# Patient Record
Sex: MALE | Race: WHITE | NOT HISPANIC OR LATINO | Employment: OTHER | URBAN - NONMETROPOLITAN AREA
[De-identification: names, ages, dates, MRNs, and addresses within clinical notes are randomized per-mention and may not be internally consistent; named-entity substitution may affect disease eponyms.]

---

## 2022-11-11 ENCOUNTER — APPOINTMENT (EMERGENCY)
Dept: RADIOLOGY | Facility: HOSPITAL | Age: 82
End: 2022-11-11

## 2022-11-11 ENCOUNTER — HOSPITAL ENCOUNTER (INPATIENT)
Facility: HOSPITAL | Age: 82
LOS: 2 days | Discharge: HOME HEALTH | End: 2022-11-13
Attending: EMERGENCY MEDICINE | Admitting: STUDENT IN AN ORGANIZED HEALTH CARE EDUCATION/TRAINING PROGRAM

## 2022-11-11 ENCOUNTER — APPOINTMENT (EMERGENCY)
Dept: CT IMAGING | Facility: HOSPITAL | Age: 82
End: 2022-11-11

## 2022-11-11 DIAGNOSIS — Z90.02 HISTORY OF LARYNGECTOMY: ICD-10-CM

## 2022-11-11 DIAGNOSIS — E11.65 TYPE 2 DIABETES MELLITUS WITH HYPERGLYCEMIA, WITHOUT LONG-TERM CURRENT USE OF INSULIN (HCC): ICD-10-CM

## 2022-11-11 DIAGNOSIS — R41.82 ALTERED MENTAL STATUS: Primary | ICD-10-CM

## 2022-11-11 DIAGNOSIS — G30.9 ALZHEIMER'S DEMENTIA (HCC): ICD-10-CM

## 2022-11-11 DIAGNOSIS — N30.90 CYSTITIS: ICD-10-CM

## 2022-11-11 DIAGNOSIS — F02.80 ALZHEIMER'S DEMENTIA (HCC): ICD-10-CM

## 2022-11-11 PROBLEM — R77.8 ELEVATED TROPONIN: Status: ACTIVE | Noted: 2022-11-11

## 2022-11-11 PROBLEM — A41.9 SEVERE SEPSIS (HCC): Status: ACTIVE | Noted: 2022-11-11

## 2022-11-11 PROBLEM — R65.20 SEVERE SEPSIS (HCC): Status: ACTIVE | Noted: 2022-11-11

## 2022-11-11 PROBLEM — G93.41 ACUTE METABOLIC ENCEPHALOPATHY: Status: ACTIVE | Noted: 2022-11-11

## 2022-11-11 PROBLEM — E44.0 MODERATE PROTEIN-CALORIE MALNUTRITION (HCC): Status: ACTIVE | Noted: 2022-11-11

## 2022-11-11 PROBLEM — N30.00 ACUTE CYSTITIS: Status: ACTIVE | Noted: 2022-11-11

## 2022-11-11 LAB
2HR DELTA HS TROPONIN: 3 NG/L
4HR DELTA HS TROPONIN: 13 NG/L
ALBUMIN SERPL BCP-MCNC: 3.9 G/DL (ref 3.5–5)
ALP SERPL-CCNC: 139 U/L (ref 46–116)
ALT SERPL W P-5'-P-CCNC: 35 U/L (ref 12–78)
ANION GAP SERPL CALCULATED.3IONS-SCNC: 11 MMOL/L (ref 4–13)
AST SERPL W P-5'-P-CCNC: 28 U/L (ref 5–45)
BACTERIA UR QL AUTO: ABNORMAL /HPF
BASE EX.OXY STD BLDV CALC-SCNC: 91.9 % (ref 60–80)
BASE EXCESS BLDV CALC-SCNC: 1.9 MMOL/L
BASOPHILS # BLD AUTO: 0.03 THOUSANDS/ÂΜL (ref 0–0.1)
BASOPHILS NFR BLD AUTO: 0 % (ref 0–1)
BETA-HYDROXYBUTYRATE: 0.2 MMOL/L
BILIRUB SERPL-MCNC: 1.48 MG/DL (ref 0.2–1)
BILIRUB UR QL STRIP: NEGATIVE
BUN SERPL-MCNC: 19 MG/DL (ref 5–25)
CALCIUM SERPL-MCNC: 9.6 MG/DL (ref 8.3–10.1)
CARDIAC TROPONIN I PNL SERPL HS: 72 NG/L
CARDIAC TROPONIN I PNL SERPL HS: 75 NG/L
CARDIAC TROPONIN I PNL SERPL HS: 85 NG/L
CHLORIDE SERPL-SCNC: 95 MMOL/L (ref 96–108)
CK SERPL-CCNC: 109 U/L (ref 39–308)
CLARITY UR: ABNORMAL
CO2 SERPL-SCNC: 28 MMOL/L (ref 21–32)
COLOR UR: YELLOW
CREAT SERPL-MCNC: 1.27 MG/DL (ref 0.6–1.3)
EOSINOPHIL # BLD AUTO: 0.12 THOUSAND/ÂΜL (ref 0–0.61)
EOSINOPHIL NFR BLD AUTO: 2 % (ref 0–6)
ERYTHROCYTE [DISTWIDTH] IN BLOOD BY AUTOMATED COUNT: 13 % (ref 11.6–15.1)
EST. AVERAGE GLUCOSE BLD GHB EST-MCNC: 209 MG/DL
GFR SERPL CREATININE-BSD FRML MDRD: 51 ML/MIN/1.73SQ M
GLUCOSE SERPL-MCNC: 205 MG/DL (ref 65–140)
GLUCOSE SERPL-MCNC: 209 MG/DL (ref 65–140)
GLUCOSE SERPL-MCNC: 272 MG/DL (ref 65–140)
GLUCOSE SERPL-MCNC: 298 MG/DL (ref 65–140)
GLUCOSE SERPL-MCNC: 318 MG/DL (ref 65–140)
GLUCOSE UR STRIP-MCNC: ABNORMAL MG/DL
HBA1C MFR BLD: 8.9 %
HCO3 BLDV-SCNC: 25.5 MMOL/L (ref 24–30)
HCT VFR BLD AUTO: 47.7 % (ref 36.5–49.3)
HGB BLD-MCNC: 16.6 G/DL (ref 12–17)
HGB UR QL STRIP.AUTO: ABNORMAL
IMM GRANULOCYTES # BLD AUTO: 0.02 THOUSAND/UL (ref 0–0.2)
IMM GRANULOCYTES NFR BLD AUTO: 0 % (ref 0–2)
KETONES UR STRIP-MCNC: NEGATIVE MG/DL
LACTATE SERPL-SCNC: 1.4 MMOL/L (ref 0.5–2)
LACTATE SERPL-SCNC: 2.5 MMOL/L (ref 0.5–2)
LEUKOCYTE ESTERASE UR QL STRIP: ABNORMAL
LYMPHOCYTES # BLD AUTO: 1.05 THOUSANDS/ÂΜL (ref 0.6–4.47)
LYMPHOCYTES NFR BLD AUTO: 15 % (ref 14–44)
MCH RBC QN AUTO: 28.7 PG (ref 26.8–34.3)
MCHC RBC AUTO-ENTMCNC: 34.8 G/DL (ref 31.4–37.4)
MCV RBC AUTO: 82 FL (ref 82–98)
MONOCYTES # BLD AUTO: 0.71 THOUSAND/ÂΜL (ref 0.17–1.22)
MONOCYTES NFR BLD AUTO: 10 % (ref 4–12)
NEUTROPHILS # BLD AUTO: 5.25 THOUSANDS/ÂΜL (ref 1.85–7.62)
NEUTS SEG NFR BLD AUTO: 73 % (ref 43–75)
NITRITE UR QL STRIP: POSITIVE
NON-SQ EPI CELLS URNS QL MICRO: ABNORMAL /HPF
NRBC BLD AUTO-RTO: 0 /100 WBCS
O2 CT BLDV-SCNC: 22.8 ML/DL
OTHER STN SPEC: ABNORMAL
PCO2 BLDV: 36.9 MM HG (ref 42–50)
PH BLDV: 7.46 [PH] (ref 7.3–7.4)
PH UR STRIP.AUTO: 6 [PH]
PLATELET # BLD AUTO: 204 THOUSANDS/UL (ref 149–390)
PMV BLD AUTO: 9.6 FL (ref 8.9–12.7)
PO2 BLDV: 66.9 MM HG (ref 35–45)
POTASSIUM SERPL-SCNC: 4.4 MMOL/L (ref 3.5–5.3)
PROT SERPL-MCNC: 7.9 G/DL (ref 6.4–8.4)
PROT UR STRIP-MCNC: ABNORMAL MG/DL
RBC # BLD AUTO: 5.79 MILLION/UL (ref 3.88–5.62)
RBC #/AREA URNS AUTO: ABNORMAL /HPF
SODIUM SERPL-SCNC: 134 MMOL/L (ref 135–147)
SP GR UR STRIP.AUTO: 1.02 (ref 1–1.03)
UROBILINOGEN UR QL STRIP.AUTO: 0.2 E.U./DL
WBC # BLD AUTO: 7.18 THOUSAND/UL (ref 4.31–10.16)
WBC #/AREA URNS AUTO: ABNORMAL /HPF
WBC CLUMPS # UR AUTO: PRESENT /UL

## 2022-11-11 RX ORDER — INSULIN LISPRO 100 [IU]/ML
1-5 INJECTION, SOLUTION INTRAVENOUS; SUBCUTANEOUS
Status: DISCONTINUED | OUTPATIENT
Start: 2022-11-11 | End: 2022-11-13 | Stop reason: HOSPADM

## 2022-11-11 RX ORDER — CEFTRIAXONE 1 G/50ML
1000 INJECTION, SOLUTION INTRAVENOUS EVERY 24 HOURS
Status: DISCONTINUED | OUTPATIENT
Start: 2022-11-12 | End: 2022-11-13

## 2022-11-11 RX ORDER — CEFTRIAXONE 1 G/50ML
1000 INJECTION, SOLUTION INTRAVENOUS ONCE
Status: COMPLETED | OUTPATIENT
Start: 2022-11-11 | End: 2022-11-11

## 2022-11-11 RX ORDER — ALLOPURINOL 100 MG/1
200 TABLET ORAL DAILY
Status: DISCONTINUED | OUTPATIENT
Start: 2022-11-11 | End: 2022-11-13 | Stop reason: HOSPADM

## 2022-11-11 RX ORDER — ACETAMINOPHEN 325 MG/1
650 TABLET ORAL EVERY 6 HOURS PRN
Status: DISCONTINUED | OUTPATIENT
Start: 2022-11-11 | End: 2022-11-13 | Stop reason: HOSPADM

## 2022-11-11 RX ORDER — ENOXAPARIN SODIUM 100 MG/ML
30 INJECTION SUBCUTANEOUS DAILY
Status: DISCONTINUED | OUTPATIENT
Start: 2022-11-11 | End: 2022-11-13 | Stop reason: HOSPADM

## 2022-11-11 RX ORDER — ATORVASTATIN CALCIUM 40 MG/1
40 TABLET, FILM COATED ORAL DAILY
COMMUNITY

## 2022-11-11 RX ORDER — ASPIRIN 81 MG/1
81 TABLET, CHEWABLE ORAL EVERY OTHER DAY
COMMUNITY

## 2022-11-11 RX ORDER — ATORVASTATIN CALCIUM 40 MG/1
40 TABLET, FILM COATED ORAL DAILY
Status: DISCONTINUED | OUTPATIENT
Start: 2022-11-11 | End: 2022-11-13 | Stop reason: HOSPADM

## 2022-11-11 RX ORDER — PANTOPRAZOLE SODIUM 40 MG/1
40 TABLET, DELAYED RELEASE ORAL
Status: DISCONTINUED | OUTPATIENT
Start: 2022-11-11 | End: 2022-11-13 | Stop reason: HOSPADM

## 2022-11-11 RX ORDER — PREGABALIN 50 MG/1
50 CAPSULE ORAL 2 TIMES DAILY
Status: DISCONTINUED | OUTPATIENT
Start: 2022-11-11 | End: 2022-11-13 | Stop reason: HOSPADM

## 2022-11-11 RX ORDER — PANTOPRAZOLE SODIUM 40 MG/1
40 TABLET, DELAYED RELEASE ORAL DAILY
COMMUNITY

## 2022-11-11 RX ORDER — SODIUM CHLORIDE 9 MG/ML
100 INJECTION, SOLUTION INTRAVENOUS CONTINUOUS
Status: DISCONTINUED | OUTPATIENT
Start: 2022-11-11 | End: 2022-11-13 | Stop reason: HOSPADM

## 2022-11-11 RX ORDER — ASPIRIN 81 MG/1
81 TABLET, CHEWABLE ORAL EVERY OTHER DAY
Status: DISCONTINUED | OUTPATIENT
Start: 2022-11-11 | End: 2022-11-13 | Stop reason: HOSPADM

## 2022-11-11 RX ORDER — PREGABALIN 50 MG/1
50 CAPSULE ORAL 2 TIMES DAILY
COMMUNITY

## 2022-11-11 RX ORDER — MEMANTINE HYDROCHLORIDE 5 MG/1
5 TABLET ORAL 2 TIMES DAILY
Status: DISCONTINUED | OUTPATIENT
Start: 2022-11-11 | End: 2022-11-13 | Stop reason: HOSPADM

## 2022-11-11 RX ORDER — MEMANTINE HYDROCHLORIDE 5 MG/1
5 TABLET ORAL 2 TIMES DAILY
COMMUNITY

## 2022-11-11 RX ORDER — FERROUS SULFATE 325(65) MG
325 TABLET ORAL
Status: DISCONTINUED | OUTPATIENT
Start: 2022-11-11 | End: 2022-11-13 | Stop reason: HOSPADM

## 2022-11-11 RX ORDER — FERROUS SULFATE 325(65) MG
325 TABLET ORAL
COMMUNITY

## 2022-11-11 RX ORDER — ALLOPURINOL 100 MG/1
200 TABLET ORAL DAILY
COMMUNITY

## 2022-11-11 RX ADMIN — FERROUS SULFATE TAB 325 MG (65 MG ELEMENTAL FE) 325 MG: 325 (65 FE) TAB at 09:16

## 2022-11-11 RX ADMIN — ALLOPURINOL 200 MG: 100 TABLET ORAL at 09:16

## 2022-11-11 RX ADMIN — ATORVASTATIN CALCIUM 40 MG: 40 TABLET, FILM COATED ORAL at 09:16

## 2022-11-11 RX ADMIN — INSULIN LISPRO 2 UNITS: 100 INJECTION, SOLUTION INTRAVENOUS; SUBCUTANEOUS at 09:26

## 2022-11-11 RX ADMIN — PANTOPRAZOLE SODIUM 40 MG: 40 TABLET, DELAYED RELEASE ORAL at 09:23

## 2022-11-11 RX ADMIN — INSULIN LISPRO 1 UNITS: 100 INJECTION, SOLUTION INTRAVENOUS; SUBCUTANEOUS at 21:15

## 2022-11-11 RX ADMIN — MEMANTINE 5 MG: 5 TABLET ORAL at 21:13

## 2022-11-11 RX ADMIN — CEFTRIAXONE 1000 MG: 1 INJECTION, SOLUTION INTRAVENOUS at 03:56

## 2022-11-11 RX ADMIN — INSULIN LISPRO 2 UNITS: 100 INJECTION, SOLUTION INTRAVENOUS; SUBCUTANEOUS at 12:24

## 2022-11-11 RX ADMIN — SODIUM CHLORIDE 100 ML/HR: 0.9 INJECTION, SOLUTION INTRAVENOUS at 09:15

## 2022-11-11 RX ADMIN — PREGABALIN 50 MG: 50 CAPSULE ORAL at 09:16

## 2022-11-11 RX ADMIN — SODIUM CHLORIDE 1000 ML: 0.9 INJECTION, SOLUTION INTRAVENOUS at 04:07

## 2022-11-11 RX ADMIN — PREGABALIN 50 MG: 50 CAPSULE ORAL at 21:13

## 2022-11-11 RX ADMIN — SODIUM CHLORIDE 100 ML/HR: 0.9 INJECTION, SOLUTION INTRAVENOUS at 23:43

## 2022-11-11 RX ADMIN — MEMANTINE 5 MG: 5 TABLET ORAL at 09:16

## 2022-11-11 RX ADMIN — Medication 12.5 MG: at 09:16

## 2022-11-11 RX ADMIN — ENOXAPARIN SODIUM 30 MG: 30 INJECTION SUBCUTANEOUS at 09:16

## 2022-11-11 RX ADMIN — ASPIRIN 81 MG CHEWABLE TABLET 81 MG: 81 TABLET CHEWABLE at 09:16

## 2022-11-11 NOTE — ED PROVIDER NOTES
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  I spoke to Sterling, patient's granddaughter, to obtain collateral information  Patient is from [place]  Patient lives w family and still drives but occasionally visits family members out of state  He hasn't tried to visit any out-of-state family members in the past 2 years  Last time the family saw patient was 11/9 at 1pm  Family has been following patient's bank transactions in [place]  They were giving him until the morning to report his whereabouts  At home, patient's mental status has been declining, and patient can forget where he's going  Medications include: Allopurinol 100mg x2 qam  Aspirin 81mg every other day qam  Iron 324mg qam  Jardiance 10mg qam  Memantine 5mg bid  Metformin 500mg x2 bid  Metoprolol 25mg (1/2 tab) qam  Pantoprazole 40mg qam  Pregabalin 50mg bid  Atorvastatin 40mg qpm      Past Medical History:   Diagnosis Date   • Diabetes mellitus ([facility] [address] )        History reviewed  No pertinent surgical history  History reviewed  No pertinent family history  I have reviewed and agree with the history as documented  E-Cigarette/Vaping     E-Cigarette/Vaping Substances     Social History     Tobacco Use   • Smoking status: Unknown If Ever Smoked       Review of Systems   Unable to perform ROS: Mental status change       Physical Exam  Physical Exam  Vitals and nursing note reviewed     Constitutional:       Comments: cachectic   HENT: Head: Normocephalic and atraumatic  Right Ear: External ear normal       Left Ear: External ear normal       Nose: Nose normal    Eyes:      Conjunctiva/sclera: Conjunctivae normal    Neck:      Comments: laryngectomy  Cardiovascular:      Rate and Rhythm: Normal rate and regular rhythm  Pulmonary:      Effort: Pulmonary effort is normal  No respiratory distress  Breath sounds: Normal breath sounds  Abdominal:      Palpations: Abdomen is soft  Tenderness: There is no abdominal tenderness  Musculoskeletal:      Cervical back: Normal range of motion and neck supple  Skin:     General: Skin is warm and dry  Neurological:      General: No focal deficit present  Mental Status: He is alert  He is disoriented  Sensory: No sensory deficit  Motor: No weakness        Comments: Obeys commands         Vital Signs  ED Triage Vitals [11/11/22 0251]   Temperature Pulse Respirations Blood Pressure SpO2   (!) 97 3 °F (36 3 °C) 96 20 (!) 185/93 100 %      Temp Source Heart Rate Source Patient Position - Orthostatic VS BP Location FiO2 (%)   Temporal Monitor Lying Left arm --      Pain Score       No Pain           Vitals:    11/11/22 0430 11/11/22 0500 11/11/22 0600 11/11/22 0726   BP: 150/70 112/60 106/60 130/70   Pulse: 89 78 81    Patient Position - Orthostatic VS:   Lying        Visual Acuity    ED Medications  Medications   enoxaparin (LOVENOX) subcutaneous injection 30 mg (has no administration in time range)   acetaminophen (TYLENOL) tablet 650 mg (has no administration in time range)   sodium chloride 0 9 % infusion (has no administration in time range)   insulin lispro (HumaLOG) 100 units/mL subcutaneous injection 1-5 Units (has no administration in time range)   insulin lispro (HumaLOG) 100 units/mL subcutaneous injection 1-5 Units (has no administration in time range)   allopurinol (ZYLOPRIM) tablet 200 mg (has no administration in time range)   aspirin chewable tablet 81 mg (has no administration in time range)   atorvastatin (LIPITOR) tablet 40 mg (has no administration in time range)   ferrous sulfate tablet 325 mg (has no administration in time range)   memantine (NAMENDA) tablet 5 mg (has no administration in time range)   metoprolol tartrate (LOPRESSOR) partial tablet 12 5 mg (has no administration in time range)   pantoprazole (PROTONIX) EC tablet 40 mg (has no administration in time range)   pregabalin (LYRICA) capsule 50 mg (has no administration in time range)   cefTRIAXone (ROCEPHIN) IVPB (premix in dextrose) 1,000 mg 50 mL (0 mg Intravenous Stopped 11/11/22 1816)   sodium chloride 0 9 % bolus 1,000 mL (0 mL Intravenous Stopped 11/11/22 8207)       Diagnostic Studies  Results Reviewed     Procedure Component Value Units Date/Time    Hemoglobin A1c w/EAG Estimation (Orders if not completed within the last 90 days) [[id number]] Collected: 11/11/22 0322    Lab Status: In process Specimen: Blood from Arm, Left Updated: 11/11/22 0716    Lactic acid 2 Hours [[id number]]  (Normal) Collected: 11/11/22 0549    Lab Status: Final result Specimen: Blood from Arm, Right Updated: 11/11/22 5342     LACTIC ACID 1 4 mmol/L     Narrative:      Result may be elevated if tourniquet was used during collection  HS Troponin I 2hr [[id number]]  (Abnormal) Collected: 11/11/22 0549    Lab Status: Final result Specimen: Blood from Arm, Right Updated: 11/11/22 0618     hs TnI 2hr 75 ng/L      Delta 2hr hsTnI 3 ng/L     Lactic acid, plasma [[id number]]  (Abnormal) Collected: 11/11/22 0322    Lab Status: Final result Specimen: Blood from Arm, Left Updated: 11/11/22 0406     LACTIC ACID 2 5 mmol/L     Narrative:      Result may be elevated if tourniquet was used during collection      HS Troponin I 4hr [[id number]]     Lab Status: No result Specimen: Blood     Comprehensive metabolic panel [[id number]]  (Abnormal) Collected: 11/11/22 0322    Lab Status: Final result Specimen: Blood from Hand, Left Updated: 11/11/22 0402     Sodium 134 mmol/L      Potassium 4 4 mmol/L      Chloride 95 mmol/L      CO2 28 mmol/L      ANION GAP 11 mmol/L      BUN 19 mg/dL      Creatinine 1 27 mg/dL      Glucose 318 mg/dL      Calcium 9 6 mg/dL      AST 28 U/L      ALT 35 U/L      Alkaline Phosphatase 139 U/L      Total Protein 7 9 g/dL      Albumin 3 9 g/dL      Total Bilirubin 1 48 mg/dL      eGFR 51 ml/min/1 73sq m     Narrative:      [place] guidelines for Chronic Kidney Disease (CKD):   •  Stage 1 with normal or high GFR (GFR > 90 mL/min/1 73 square meters)  •  Stage 2 Mild CKD (GFR = 60-89 mL/min/1 73 square meters)  •  Stage 3A Moderate CKD (GFR = 45-59 mL/min/1 73 square meters)  •  Stage 3B Moderate CKD (GFR = 30-44 mL/min/1 73 square meters)  •  Stage 4 Severe CKD (GFR = 15-29 mL/min/1 73 square meters)  •  Stage 5 End Stage CKD (GFR <15 mL/min/1 73 square meters)  Note: GFR calculation is accurate only with a steady state creatinine    Urine Microscopic [[id number]]  (Abnormal) Collected: 11/11/22 0330    Lab Status: Final result Specimen: Urine, Clean Catch Updated: 11/11/22 0359     RBC, UA 30-50 /hpf      WBC, UA Innumerable /hpf      Epithelial Cells Occasional /hpf      Bacteria, UA Occasional /hpf      WBC Clumps Present     OTHER OBSERVATIONS Yeast Cells Present    Urine culture [[id number]] Collected: 11/11/22 0330    Lab Status:  In process Specimen: Urine, Clean Catch Updated: 11/11/22 0358    HS Troponin 0hr (reflex protocol) [[id number]]  (Abnormal) Collected: 11/11/22 0322    Lab Status: Final result Specimen: Blood from Arm, Left Updated: 11/11/22 0358     hs TnI 0hr 72 ng/L     CK (with reflex to MB) [[id number]]  (Normal) Collected: 11/11/22 0322    Lab Status: Final result Specimen: Blood from Arm, Left Updated: 11/11/22 0352     Total  U/L     Beta Hydroxybutyrate [[id number]]  (Normal) Collected: 11/11/22 0322    Lab Status: Final result Specimen: Blood from Arm, Left Updated: 11/11/22 0346 BETA-HYDROXYBUTYRATE 0 2 mmol/L     UA w Reflex to Microscopic w Reflex to Culture [[id number]]  (Abnormal) Collected: 11/11/22 0330    Lab Status: Final result Specimen: Urine, Clean Catch Updated: 11/11/22 0343     Color, UA Yellow     Clarity, UA Cloudy     Specific Gravity, UA 1 025     pH, UA 6 0     Leukocytes, UA Elevated glucose may cause decreased leukocyte values  See urine microscopic for [facility] result/     Nitrite, UA Positive     Protein,  (2+) mg/dl      Glucose, UA >=1000 (1%) mg/dl      Ketones, UA Negative mg/dl      Urobilinogen, UA 0 2 E U /dl      Bilirubin, UA Negative     Occult Blood, UA Large    Blood gas, venous [[id number]]  (Abnormal) Collected: 11/11/22 0322    Lab Status: Final result Specimen: Blood from Arm, Left Updated: 11/11/22 0338     pH, Nic 7 457     pCO2, Nic 36 9 mm Hg      pO2, Nic 66 9 mm Hg      HCO3, Nic 25 5 mmol/L      Base Excess, Nic 1 9 mmol/L      O2 Content, Nic 22 8 ml/dL      O2 HGB, VENOUS 91 9 %     CBC and differential [[id number]]  (Abnormal) Collected: 11/11/22 0322    Lab Status: Final result Specimen: Blood from Arm, Left Updated: 11/11/22 0333     WBC 7 18 Thousand/uL      RBC 5 79 Million/uL      Hemoglobin 16 6 g/dL      Hematocrit 47 7 %      MCV 82 fL      MCH 28 7 pg      MCHC 34 8 g/dL      RDW 13 0 %      MPV 9 6 fL      Platelets 472 Thousands/uL      nRBC 0 /100 WBCs      Neutrophils Relative 73 %      Immat GRANS % 0 %      Lymphocytes Relative 15 %      Monocytes Relative 10 %      Eosinophils Relative 2 %      Basophils Relative 0 %      Neutrophils Absolute 5 25 Thousands/µL      Immature Grans Absolute 0 02 Thousand/uL      Lymphocytes Absolute 1 05 Thousands/µL      Monocytes Absolute 0 71 Thousand/µL      Eosinophils Absolute 0 12 Thousand/µL      Basophils Absolute 0 03 Thousands/µL     Blood culture #2 [[id number]] Collected: 11/11/22 0322    Lab Status:  In process Specimen: Blood from Hand, Left Updated: 11/11/22 0330    Blood culture #1 [[id number]] Collected: 11/11/22 0322    Lab Status: In process Specimen: Blood from Arm, Left Updated: 11/11/22 0330             XR chest 2 views   Final Result by Shaggy Coley MD (11/11 0730)      No acute cardiopulmonary disease  Workstation performed: [id number]         CT head without contrast   Final Result by Betty Diaz DO (11/11 6514)      No acute intracranial abnormality is seen  Other findings as above  Workstation performed: [id number]                  Procedures  ECG 12 Lead Documentation Only    Date/Time: 11/11/2022 5:08 AM  Performed by: Petrona Dawn MD  Authorized by: Petrona Dawn MD     Patient location:  ED  Interpretation:     Interpretation: non-specific    Rate:     ECG rate:  94    ECG rate assessment: normal    Rhythm:     Rhythm: sinus rhythm    Ectopy:     Ectopy comment:  Ectopic atrial rhythm  QRS:     QRS axis:  Left  Conduction:     Conduction: abnormal      Abnormal conduction: complete RBBB and LAFB    ST segments:     ST segments:  Non-specific  T waves:     T waves: peaked        ED Course  ED Course as of 11/11/22 0737   Fri Nov 11, 2022   0346 WBC: 7 18   0346 Hemoglobin: 16 6   0347 BETA-HYDROXYBUTYRATE: 0 2   0347 Glucose, UA(!): >=1000 (1%)   0347 Urinalysis c/w with UTI  Will give IV antibiotics  0355 Total CK: 109   0420 CT Head  IMPRESSION:  No acute intracranial abnormality is seen  6730 Discussed w Sharonda Duong  Accepts patient for observation  MDM   82M w hx of Alzheimer's presenting w altered mental status  Reportedly was combative for EMS so he arrived in restraints, however we removed the restraints upon arrival and patient was cooperative  It is difficult to understand patient as he has 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 and mouths his words  He follows commands and can answer yes or no to questions  CMP significant for mild hyponatremia 134  Lactate elevated at 2 5   Troponin elevated at 72; repeat minimally changed at 75 (patient is without chest pain)  CT head negative for acute intracranial abnl  CXR was personally reviewed and interpreted by me as negative for pneumothorax, effusion, infiltrate or widened mediastinum  Urinalysis significant for UTI; patient given IV ceftriaxone  I discussed case with medicine and patient was hospitalized under observation  Disposition  Final diagnoses:    Altered mental status   Cystitis   Alzheimer's dementia ([address] )     Time reflects when diagnosis was documented in both MDM as applicable and the Disposition within this note     Time User Action Codes Description Comment    11/11/2022  5:10 AM Trevizo Cos Add [R41 82] Altered mental status     11/11/2022  5:10 AM Trevizo Cos Add [N30 90] Cystitis     11/11/2022  7:37 AM Trevizo Cos Add [G30 9,  F02 80] Alzheimer's dementia [facility])       ED Disposition     ED Disposition   Admit    Condition   Stable    Date/Time   Fri Nov 11, 2022  6:28 AM    Comment   Case was discussed with Sharonda Duong and the patient's admission status was agreed to be Admission Status: observation status to the service of Dr Joao Flannery            Follow-up Information    None         Current Discharge Medication List      CONTINUE these medications which have NOT CHANGED    Details   allopurinol (ZYLOPRIM) 100 mg tablet Take 200 mg by mouth daily      aspirin 81 mg chewable tablet Chew 81 mg every other day      atorvastatin (LIPITOR) 40 mg tablet Take 40 mg by mouth daily      Empagliflozin (Jardiance) 10 MG TABS tablet Take 10 mg by mouth daily      ferrous sulfate 325 (65 Fe) mg tablet Take 325 mg by mouth daily with breakfast      memantine (Namenda) 5 mg tablet Take 5 mg by mouth 2 (two) times a day      metFORMIN (GLUCOPHAGE) 1000 MG tablet Take 1,000 mg by mouth 2 (two) times a day with meals      metoprolol tartrate (LOPRESSOR) 25 mg tablet Take 12 5 mg by mouth every morning      pantoprazole (PROTONIX) 40 mg tablet Take 40 mg by mouth daily      pregabalin (LYRICA) 50 mg capsule Take 50 mg by mouth 2 (two) times a day             No discharge procedures on file      PDMP Review     None          ED Provider  Electronically Signed by           Esthela Barrios MD  11/11/22 9553

## 2022-11-11 NOTE — PLAN OF CARE
Problem: PAIN - ADULT  Goal: Verbalizes/displays adequate comfort level or baseline comfort level  Description: Interventions:  - Encourage patient to monitor pain and request assistance  - Assess pain using appropriate pain scale  - Administer analgesics based on type and severity of pain and evaluate response  - Implement non-pharmacological measures as appropriate and evaluate response  - Consider cultural and social influences on pain and pain management  - Notify physician/advanced practitioner if interventions unsuccessful or patient reports new pain  Outcome: Progressing     Problem: INFECTION - ADULT  Goal: Absence or prevention of progression during hospitalization  Description: INTERVENTIONS:  - Assess and monitor for signs and symptoms of infection  - Monitor lab/diagnostic results  - Monitor all insertion sites, i e  indwelling lines, tubes, and drains  - Monitor endotracheal if appropriate and nasal secretions for changes in amount and color  - [place] appropriate cooling/warming therapies per order  - Administer medications as ordered  - Instruct and encourage patient and family to use good hand hygiene technique  - Identify and instruct in appropriate isolation precautions for identified infection/condition  Outcome: Progressing     Problem: DISCHARGE PLANNING  Goal: Discharge to home or other facility with appropriate resources  Description: INTERVENTIONS:  - Identify barriers to discharge w/patient and caregiver  - Arrange for needed discharge resources and transportation as appropriate  - Identify discharge learning needs (meds, wound care, etc )  - Arrange for interpretive services to assist at discharge as needed  - Refer to Case Management Department for coordinating discharge planning if the patient needs post-hospital services based on physician/advanced practitioner order or complex needs related to functional status, cognitive ability, or social support system  Outcome: Progressing Problem: Knowledge Deficit  Goal: Patient/family/caregiver demonstrates understanding of disease process, treatment plan, medications, and discharge instructions  Description: Complete learning assessment and assess knowledge base  Interventions:  - Provide teaching at level of understanding  - Provide teaching via preferred learning methods  Outcome: Progressing     Problem: NEUROSENSORY - ADULT  Goal: Achieves stable or improved neurological status  Description: INTERVENTIONS  - Monitor and report changes in neurological status  - Monitor vital signs such as temperature, blood pressure, glucose, and any other labs ordered   - Initiate measures to prevent increased intracranial pressure  - Monitor for seizure activity and implement precautions if appropriate      Outcome: Progressing  Goal: Remains free of injury related to seizures activity  Description: INTERVENTIONS  - Maintain airway, patient safety  and administer oxygen as ordered  - Monitor patient for seizure activity, document and report duration and description of seizure to physician/advanced practitioner  - If seizure occurs,  ensure patient safety during seizure  - Reorient patient post seizure  - Seizure pads on all 4 side rails  - Instruct patient/family to notify RN of any seizure activity including if an aura is experienced  - Instruct patient/family to call for assistance with activity based on nursing assessment  - Administer anti-seizure medications if ordered    Outcome: Progressing  Goal: Achieves maximal functionality and self care  Description: INTERVENTIONS  - Monitor swallowing and airway patency with patient fatigue and changes in neurological status  - Encourage and assist patient to increase activity and self care     - Encourage visually impaired, hearing impaired and aphasic patients to use assistive/communication devices  Outcome: Progressing

## 2022-11-11 NOTE — ASSESSMENT & PLAN NOTE
· History Alzheimer's dementia  · Patient is from [place] and drove to [place] where he was found earlier today 11/11  · Continue Namenda  · Alert and oriented x3 at time of admission after fluid resuscitation and IV antibiotics  · Continue to monitor neuro status

## 2022-11-11 NOTE — PLAN OF CARE
Problem: MOBILITY - ADULT  Goal: Maintain or return to baseline ADL function  Description: INTERVENTIONS:  -  Assess patient's ability to carry out ADLs; assess patient's baseline for ADL function and identify physical deficits which impact ability to perform ADLs (bathing, care of mouth/teeth, toileting, grooming, dressing, etc )  - Assess/evaluate cause of self-care deficits   - Assess range of motion  - Assess patient's mobility; develop plan if impaired  - Assess patient's need for assistive devices and provide as appropriate  - Encourage maximum independence but intervene and supervise when necessary  - Involve family in performance of ADLs  - Assess for home care needs following discharge   - Consider OT consult to assist with ADL evaluation and planning for discharge  - Provide patient education as appropriate  Outcome: Progressing  Goal: Maintains/Returns to pre admission functional level  Description: INTERVENTIONS:  - Perform BMAT or MOVE assessment daily    - Set and communicate daily mobility goal to care team and patient/family/caregiver  - Collaborate with rehabilitation services on mobility goals if consulted  - Perform Range of Motion 2 times a day  - Reposition patient every 2 hours    - Dangle patient 2 times a day  - Stand patient 2 times a day  - Ambulate patient 2 times a day  - Out of bed to chair 3 times a day   - Out of bed for meals 3 times a day  - Out of bed for toileting  - Record patient progress and toleration of activity level   Outcome: Progressing     Problem: PAIN - ADULT  Goal: Verbalizes/displays adequate comfort level or baseline comfort level  Description: Interventions:  - Encourage patient to monitor pain and request assistance  - Assess pain using appropriate pain scale  - Administer analgesics based on type and severity of pain and evaluate response  - Implement non-pharmacological measures as appropriate and evaluate response  - Consider cultural and social influences on pain and pain management  - Notify physician/advanced practitioner if interventions unsuccessful or patient reports new pain  Outcome: Progressing     Problem: INFECTION - ADULT  Goal: Absence or prevention of progression during hospitalization  Description: INTERVENTIONS:  - Assess and monitor for signs and symptoms of infection  - Monitor lab/diagnostic results  - Monitor all insertion sites, i e  indwelling lines, tubes, and drains  - Monitor endotracheal if appropriate and nasal secretions for changes in amount and color  - [place] appropriate cooling/warming therapies per order  - Administer medications as ordered  - Instruct and encourage patient and family to use good hand hygiene technique  - Identify and instruct in appropriate isolation precautions for identified infection/condition  Outcome: Progressing     Problem: SAFETY ADULT  Goal: Maintain or return to baseline ADL function  Description: INTERVENTIONS:  -  Assess patient's ability to carry out ADLs; assess patient's baseline for ADL function and identify physical deficits which impact ability to perform ADLs (bathing, care of mouth/teeth, toileting, grooming, dressing, etc )  - Assess/evaluate cause of self-care deficits   - Assess range of motion  - Assess patient's mobility; develop plan if impaired  - Assess patient's need for assistive devices and provide as appropriate  - Encourage maximum independence but intervene and supervise when necessary  - Involve family in performance of ADLs  - Assess for home care needs following discharge   - Consider OT consult to assist with ADL evaluation and planning for discharge  - Provide patient education as appropriate  Outcome: Progressing  Goal: Maintains/Returns to pre admission functional level  Description: INTERVENTIONS:  - Perform BMAT or MOVE assessment daily    - Set and communicate daily mobility goal to care team and patient/family/caregiver     - Collaborate with rehabilitation services on mobility goals if consulted  - Perform Range of Motion 2 times a day  - Reposition patient every 2 hours    - Dangle patient 2 times a day  - Stand patient 2 times a day  - Ambulate patient 2 times a day  - Out of bed to chair 3 times a day   - Out of bed for meals 3 times a day  - Out of bed for toileting  - Record patient progress and toleration of activity level   Outcome: Progressing  Goal: Patient will remain free of falls  Description: INTERVENTIONS:  - Educate patient/family on patient safety including physical limitations  - Instruct patient to call for assistance with activity   - Consult OT/PT to assist with strengthening/mobility   - Keep Call bell within reach  - Keep bed low and locked with side rails adjusted as appropriate  - Keep care items and personal belongings within reach  - Initiate and maintain comfort rounds  - Make Fall Risk Sign visible to staff  - Offer Toileting every 2 Hours, in advance of need  - Initiate/Maintain bed/chair alarm  - Obtain necessary fall risk management equipment:   - Apply yellow socks and bracelet for high fall risk patients  - Consider moving patient to room near nurses station  Outcome: Progressing     Problem: DISCHARGE PLANNING  Goal: Discharge to home or other facility with appropriate resources  Description: INTERVENTIONS:  - Identify barriers to discharge w/patient and caregiver  - Arrange for needed discharge resources and transportation as appropriate  - Identify discharge learning needs (meds, wound care, etc )  - Arrange for interpretive services to assist at discharge as needed  - Refer to Case Management Department for coordinating discharge planning if the patient needs post-hospital services based on physician/advanced practitioner order or complex needs related to functional status, cognitive ability, or social support system  Outcome: Progressing     Problem: Knowledge Deficit  Goal: Patient/family/caregiver demonstrates understanding of disease process, treatment plan, medications, and discharge instructions  Description: Complete learning assessment and assess knowledge base  Interventions:  - Provide teaching at level of understanding  - Provide teaching via preferred learning methods  Outcome: Progressing     Problem: NEUROSENSORY - ADULT  Goal: Achieves stable or improved neurological status  Description: INTERVENTIONS  - Monitor and report changes in neurological status  - Monitor vital signs such as temperature, blood pressure, glucose, and any other labs ordered   - Initiate measures to prevent increased intracranial pressure  - Monitor for seizure activity and implement precautions if appropriate      Outcome: Progressing  Goal: Remains free of injury related to seizures activity  Description: INTERVENTIONS  - Maintain airway, patient safety  and administer oxygen as ordered  - Monitor patient for seizure activity, document and report duration and description of seizure to physician/advanced practitioner  - If seizure occurs,  ensure patient safety during seizure  - Reorient patient post seizure  - Seizure pads on all 4 side rails  - Instruct patient/family to notify RN of any seizure activity including if an aura is experienced  - Instruct patient/family to call for assistance with activity based on nursing assessment  - Administer anti-seizure medications if ordered    Outcome: Progressing  Goal: Achieves maximal functionality and self care  Description: INTERVENTIONS  - Monitor swallowing and airway patency with patient fatigue and changes in neurological status  - Encourage and assist patient to increase activity and self care     - Encourage visually impaired, hearing impaired and aphasic patients to use assistive/communication devices  Outcome: Progressing

## 2022-11-11 NOTE — PHYSICAL THERAPY NOTE
PHYSICAL THERAPY EVALUATION  NAME:  Ann Nye  DATE: 11/11/22    AGE:   79 y o  Mrn:   [id number]  ADMIT DX:  Altered mental status [R41 82]  Cystitis [N30 90]    Past Medical History:   Diagnosis Date    Bladder cancer ([facility] )     Diabetes mellitus ([facility] )     Hypertension      Length Of Stay: 0  Performed at least 2 patient identifiers during session: Name and Birthday  PHYSICAL THERAPY EVALUATION :        11/11/22 6744   Note Type   Note type Evaluation   Pain Assessment   Pain Assessment Tool 0-10   Pain Score No Pain   Restrictions/Precautions   Other Precautions Cognitive; Chair Alarm; Bed Alarm;Multiple lines; Fall Risk   Home Living   Type of [address]   (no AD at baseline)   Additional Comments Pt is a poor historian, limited PLOF and home setup provided, will consult with CM to obtain info   Prior Function   Level of [place] Independent with ADLs; Independent with functional mobility; Independent with IADLS   Lives With Family   General   Additional Pertinent History Pt with hx of laryngectomy, limited ability to communicate   Cognition   Overall Cognitive Status Impaired   Orientation Level Oriented to person;Oriented to place; Disoriented to time;Disoriented to situation   Following Commands Follows one step commands with increased time or repetition   RLE Assessment   RLE Assessment WFL  (4/5 strength)   LLE Assessment   LLE Assessment WFL  (4/5 strength)   Light Touch   RLE Light Touch Grossly intact   LLE Light Touch Grossly intact   Bed Mobility   Supine to Sit 5  Supervision   Additional items Increased time required;Verbal cues   Additional Comments Supervision with HOB elevated, supervision for safety   Transfers   Sit to Stand   (SBA)   Additional items Increased time required;Verbal cues   Stand to Sit   (SBA)   Additional items Increased time required;Verbal cues   Stand pivot   (SPT)   Additional items Increased time required;Verbal cues   Additional Comments SBA without AD for STS and SPT transfers, VC for safety, pt slightly impulsive   Ambulation/Elevation   Gait pattern Improper Weight shift;Narrow BRENNA; Decreased foot clearance; Short stride; Excessively slow   Gait Assistance   (SBA)   Additional items Verbal cues   Assistive Device None   Distance 25' + 150' No AD, recommended use of RW however pt adamantly declining  SBA for safety with noted imbalance without LOB, decreased safety insight   Stair Management Assistance   (SBA)   Additional items Verbal cues   Stair Management Technique Two rails; Alternating pattern; Foreward   Number of Stairs 2   Balance   Static Sitting Good   Dynamic Sitting Fair +   Static Standing Fair   Dynamic Standing Fair -   Ambulatory Fair -   Endurance Deficit   Endurance Deficit Yes   Endurance Deficit Description fatigue   Activity Tolerance   Activity Tolerance Patient limited by fatigue   Medical Staff Made Aware OT, Claudell Loyal   Nurse Made Aware RNIman   Assessment   Prognosis Good   Problem List Decreased strength;Decreased endurance; Impaired balance;Decreased mobility; Decreased cognition;Decreased safety awareness; Impaired judgement   Barriers to Discharge Comments Unsure of amount of family support, will consult with CM   Goals   Patient Goals Go home   STG Expiration Date 11/25/22   Plan   Treatment/Interventions Functional transfer training;LE strengthening/ROM; Elevations; Therapeutic exercise; Endurance training;Patient/family training;Equipment eval/education; Bed mobility;Gait training; Compensatory technique education;Spoke to nursing;OT   PT Frequency 2-3x/wk   Recommendation   PT Discharge Recommendation Home with home health rehabilitation  (and 24/7 supervision) Should pt's family be unable to provide, consider STR   Equipment Recommended   (pt declining RW use)   AM-PAC Basic Mobility Inpatient   Turning in Bed Without Bedrails 3   Lying on Back to Sitting on Edge of Flat Bed 3   Moving Bed to Chair 3   Standing Up From Chair 3 Walk in Room 3   Climb 3-5 Stairs 3   Basic Mobility Inpatient Raw Score 18   Basic Mobility Standardized Score 41 05   Highest Level Of Mobility   JH-HLM Goal 6: Walk 10 steps or more   JH-HLM Achieved 7: Walk 25 feet or more   End of Consult   Patient Position at End of Consult Bedside chair;Bed/Chair alarm activated; All needs within reach     The patient's AM-PAC Basic Mobility Inpatient Short Form Raw Score is 18    A Raw score of greater than 16 suggests the patient may benefit from discharge to home  Please also refer to the recommendation of the Physical Therapist for safe discharge planning  (Please find full objective findings from PT assessment regarding body systems outlined above)  Assessment: Pt is a 79 y o  male seen for PT evaluation s/p admission to [address] [place] [address] on 11/11/2022 with Acute metabolic encephalopathy  Order placed for PT services  Upon evaluation: Pt is presenting with impaired functional mobility due to decreased strength, decreased endurance, impaired balance, gait deviations, impaired cognition, decreased safety awareness, impaired judgment, and fall risk requiring  supervision assistance for bed mobility and SBA assistance for transfers and ambulation with no AD   Pt's clinical presentation is currently unstable/unpredictable given the functional mobility deficits above coupled with fall risks as indicated by AM-PAC 6-Clicks: 40/41 as well as impaired balance, polypharmacy, impaired judgement, decreased safety awareness, and decreased cognition and combined with medical complications of hypertension  and need for input for mobility technique/safety  Pt's PMHx and comorbidities that may affect physical performance and progress include: DM, HTN, Dementia, CAD, limited communication, cancer history and/or treatment, and hx of laryngectomy    Personal factors affecting pt at time of IE include: advanced age, past experience, behavioral pattern, inability to navigate community distances, and limited insight into impairments  Pt will benefit from continued skilled PT services to address deficits as defined above and to maximize level of functional mobility to facilitate return toward PLOF and improved QOL  From PT/mobility standpoint, recommendation at time of d/c would be Home PT with 24/7 supervision pending progress in order to reduce fall risk and maximize pt's functional independence and consistency with mobility in order to facilitate return to PLOF  Recommend trial with walker next 1-2 sessions and ther ex next 1-2 sessions  Goals: Pt will: Perform bed mobility tasks with modified I to reposition in bed and prepare for transfers  Pt will perform transfers with modified I to increase Indep in home environment and prepare for ambulation  Pt will ambulate with LRAD for >/= 250' with  Supervision  to improve gait quality and promote proper use of assistive device and to access home environment  Pt will complete >/= 12 steps with with unilateral handrail with Supervision to return to multilevel home  Increase bilateral LE strength 1/2 grade to facilitate independent mobility and Increase all balance 1/2 grade to decrease risk for falls          Valarie Siu, PT,DPT

## 2022-11-11 NOTE — ASSESSMENT & PLAN NOTE
Malnutrition Findings: Loss of subcutaneous fat in orbital, triceps, ribcage areas  Loss of muscle at temples, clavicles, scapula, extremities  Consultation to dietitian           BMI Findings: Body mass index is 18 8 kg/m²

## 2022-11-11 NOTE — PLAN OF CARE
Problem: OCCUPATIONAL THERAPY ADULT  Goal: Performs self-care activities at highest level of function for planned discharge setting  See evaluation for individualized goals  Description: Treatment Interventions: ADL retraining, Functional transfer training, UE strengthening/ROM, Endurance training, Cognitive reorientation, Patient/family training, Equipment evaluation/education, Neuromuscular reeducation, Compensatory technique education, Continued evaluation, Energy conservation, Activityengagement          See flowsheet documentation for full assessment, interventions and recommendations  Note: Limitation: Decreased ADL status, Decreased UE strength, Decreased Safe judgement during ADL, Decreased cognition, Decreased endurance, Decreased self-care trans, Decreased high-level ADLs  Prognosis: Good  Assessment: Pt is a 79 y o  male, admitted to [address] [place] [address] 11/11/2022 d/t experiencing AMS  Dx: acute metabolic encephalopathy  Pt with PMHx impacting their performance during ADL tasks, including: laryngectomy, alzheimer's, DM, bladder cancer, HTN  Prior to admission to the hospital Pt was performing ADLs without physical assistance  IADLs without physical assistance  Functional transfers/ambulation without physical assistance  Cognitive status was PTA was impaired  OT order placed to assess Pt's ADLs, cognitive status, and performance during functional tasks in order to maximize safety and independence while making most appropriate d/c recommendations   Pt's clinical presentation is currently unstable/unpredictable given new onset deficits that effect Pt's occupational performance and ability to safely return to [facility] including decrease activity tolerance, decrease standing balance, decrease performance during ADL tasks, decrease cognition, decrease safety awareness , increase impulsiveness, decrease UB MS, decrease generalized strength, decrease activity engagement, decrease performance during functional transfers, high fall risk and limited insight to deficits combined with medical complications of change in mental status, abnormal sodium values, abnormal CO2 values, decreased skin integrity, impulsivity during admission, fear/retreat and need for input for mobility technique/safety  Personal factors affecting Pt at time of initial evaluation include: availability as recommended, advanced age, past experience, behavioral pattern, inability to perform current job functions, inability to perform IADLs, inability to navigate community distances, limited insight into impairments, decreased initiation and engagement, recent fall(s)/fall history and questionable non-compliance  Pt will benefit from continued skilled OT services to address deficits as defined above and to maximize level independence/participation during ADLs and functional tasks to facilitate return toward PLOF and improved quality of life  From an occupational therapy standpoint, recommendation at time of d/c would be [address] [place] [address] with 24/7  If 24/7 is not available, please consider post acute rehab or [facility]       OT Discharge Recommendation: (S) Home with home health rehabilitation (Jerman Mention if 24/7 supervision is available)

## 2022-11-11 NOTE — CASE MANAGEMENT
Case Management Assessment & Discharge Planning Note    Patient name Sawyer First  Location /-01 MRN [id number]  : 1940 Date 2022       Current Admission Date: 2022  Current Admission Diagnosis:Acute metabolic encephalopathy   Patient Active Problem List    Diagnosis Date Noted   • Acute cystitis 2022   • Type 2 diabetes mellitus with hyperglycemia, without long-term current use of insulin ([facility] ) 2022   • Moderate protein-calorie malnutrition ([facility] ) 2022   • Elevated troponin 2022   • Severe sepsis ([facility] ) 2022   • Alzheimer's dementia ([facility] ) 2022   • Acute metabolic encephalopathy    • History of laryngectomy 2022      LOS (days): 0  Geometric Mean LOS (GMLOS) (days): 5 00  Days to GMLOS:4 9     OBJECTIVE:              Current admission status: Inpatient       Preferred Pharmacy: No Pharmacies Listed  Primary Care Provider: No primary care provider on file  Primary Insurance: BLUE CROSS  Secondary Insurance:     ASSESSMENT:  Active Health Care Proxies    There are no active Health Care Proxies on file  Advance Directives  Does patient have a [address]?: No  Does patient currently have a Health Care decision maker?: Yes, please see Health Care Proxy section (grand daughter, Alex Gimenez)  Does patient have Advance Directives?: No  Primary Contact: Hazelnorisne Abbot, grand luuqe              Patient Information  Admitted from[de-identified] Home  Mental Status: Confused  During Assessment patient was accompanied by: Not accompanied during assessment  Assessment information provided by[de-identified] Other - please comment (pts grandchild)  Primary Caregiver: Family  Caregiver's Name[de-identified] Dariel Benitez, grand daughter  Caregiver's Relationship to Patient[de-identified] Family Member  Support Systems: Spouse/significant other, Daughter, Family members  Home entry access options   Select all that apply : Stairs  Number of steps to enter home : 4  Type of Current Residence: 2 story home  In the last 12 months, was there a time when you were not able to pay the mortgage or rent on time?: No  In the last 12 months, how many places have you lived?: 1  In the last 12 months, was there a time when you did not have a steady place to sleep or slept in a shelter (including now)?: No  Living Arrangements: Lives w/ Extended Family (lives with grand daughter and her family)    Activities of Daily Living Prior to Admission  Functional Status: Independent  Completes ADLs independently?: Yes  Ambulates independently?: Yes  Does patient use assisted devices?: No  Does patient currently own DME?: No  Does patient have a history of Outpatient Therapy (PT/OT)?: No  Does the patient have a history of Short-Term Rehab?: No  Does patient have a history of HHC?: No  [facility] No         Patient Information Continued  Income Source: Pension/[facility]  Does patient have prescription coverage?: Yes  Within the past 12 months, you worried that your food would run out before you got the money to buy more : Never true  Within the past 12 months, the food you bought just didn't last and you didn't have money to get more : Never true  Food insecurity resource given?: No  Does patient receive dialysis treatments?: No  Does patient have a history of substance abuse?: No  Does patient have a history of Mental Health Diagnosis?: No         Means of Transportation  Means of Transport to Appts[de-identified] Drives Self  In the past 12 months, has lack of transportation kept you from medical appointments or from getting medications?: No  In the past 12 months, has lack of transportation kept you from meetings, work, or from getting things needed for daily living?: No        DISCHARGE DETAILS:    Discharge planning discussed with[de-identified] pts grand daughterJose Manuel        CM contacted family/caregiver?: Yes  Were Treatment Team discharge recommendations reviewed with patient/caregiver?: Yes  Did patient/caregiver verbalize understanding of patient care needs?: Yes  Were patient/caregiver advised of the risks associated with not following Treatment Team discharge recommendations?: Yes    Contacts  Patient Contacts: Magda coppola  Relationship to Patient[de-identified] Family  Contact Method: Phone  Reason/Outcome: Discharge Planning                   Would you like to participate in our [address] service program?  : No - Declined                 Pt is from [place]   Pt drove his car to [place], was found at a gas station  Pt with dementia  Pt lives with his grand daughter, in her home  Also in the home are pts wife, and grand daughter's 2 sons  As per grand daughter, pt was Mariusz Duke  Pts wife is currently in [facility] and is anticipated to be released on 11/21/22  Pt has a history of a laryngectomy, syed not use speaking valve, pt is non verbal, [facility]    [facility] vs OP PT with 24/7 supervision has been recommended for pt at time of discharge  As per grand daughter, pt has 24/7 supervision, "someone is always inn the home" with patient  Grand daughter, sts she intends to come to  pt and drive him ho me at time of discharge

## 2022-11-11 NOTE — ASSESSMENT & PLAN NOTE
· Troponin 72 without EKG changes  · Denies chest pain  · Presumed secondary to urinary tract infection and sepsis  · Monitor  · HX CAD but patient will need to be registered appropriately before we can access Care everywhere for further details  · Continue daily aspirin, atorvastatin

## 2022-11-11 NOTE — ASSESSMENT & PLAN NOTE
· Tachycardia, tachypnea, lactic acid elevation 2 5  · Urinary source of infection  · Empiric ceftriaxone  · Cultures pending  · IV fluid resuscitation  · Trend fever curve, leukocytosis

## 2022-11-11 NOTE — PLAN OF CARE
Problem: PHYSICAL THERAPY ADULT  Goal: Performs mobility at highest level of function for planned discharge setting  See evaluation for individualized goals  Description: Treatment/Interventions: Functional transfer training, LE strengthening/ROM, Elevations, Therapeutic exercise, Endurance training, Patient/family training, Equipment eval/education, Bed mobility, Gait training, Compensatory technique education, Spoke to nursing, OT  Equipment Recommended:  (pt declining RW use)       See flowsheet documentation for full assessment, interventions and recommendations  Note: Prognosis: Good  Problem List: Decreased strength, Decreased endurance, Impaired balance, Decreased mobility, Decreased cognition, Decreased safety awareness, Impaired judgement  Assessment: Pt is a 79 y o  male seen for PT evaluation s/p admission to [address] [place] [address] on 11/11/2022 with Acute metabolic encephalopathy  Order placed for PT services  Upon evaluation: Pt is presenting with impaired functional mobility due to decreased strength, decreased endurance, impaired balance, gait deviations, impaired cognition, decreased safety awareness, impaired judgment, and fall risk requiring  supervision assistance for bed mobility and SBA assistance for transfers and ambulation with no AD   Pt's clinical presentation is currently unstable/unpredictable given the functional mobility deficits above coupled with fall risks as indicated by AM-PAC 6-Clicks: 84/89 as well as impaired balance, polypharmacy, impaired judgement, decreased safety awareness, and decreased cognition and combined with medical complications of hypertension  and need for input for mobility technique/safety  Pt's PMHx and comorbidities that may affect physical performance and progress include: DM, HTN, Dementia, CAD, limited communication, cancer history and/or treatment, and hx of laryngectomy    Personal factors affecting pt at time of IE include: advanced age, past experience, behavioral pattern, inability to navigate community distances, and limited insight into impairments  Pt will benefit from continued skilled PT services to address deficits as defined above and to maximize level of functional mobility to facilitate return toward PLOF and improved QOL  From PT/mobility standpoint, recommendation at time of d/c would be Home PT with 24/7 supervision pending progress in order to reduce fall risk and maximize pt's functional independence and consistency with mobility in order to facilitate return to PLOF  Recommend trial with walker next 1-2 sessions and ther ex next 1-2 sessions  Barriers to Discharge Comments: Unsure of amount of family support, will consult with CM  PT Discharge Recommendation: Home with home health rehabilitation (and 24/7 supervision)    See flowsheet documentation for full assessment

## 2022-11-11 NOTE — H&P
[address] Cassia Shelley  H&P- [address] 1940, 79 y o  male MRN: [id number]  Unit/Bed#: -Brigitte Encounter: [id number]  Primary Care Provider: No primary care provider on file  Date and time admitted to hospital: 11/11/2022  2:47 AM    * Acute metabolic encephalopathy  Assessment & Plan  · Severe confusion on top of known dementia in setting of urinary tract infection and sepsis  · Patient found by [facility] police in his car, contacted his family in [place] who stated he left 2 days ago and was unable to be found  · CT head no acute abnormality  · Improving after fluid resuscitation  · monitor neuro status  · PT/OT/case management for assistance in returning patient to his family    Severe sepsis ([facility] [place] [facility] )  Assessment & Plan  · Tachycardia, tachypnea, lactic acid elevation 2 5  · Urinary source of infection  · Empiric ceftriaxone  · Cultures pending  · IV fluid resuscitation  · Trend fever curve, leukocytosis    Acute cystitis  Assessment & Plan  · Innumerable pyuria on at urinalysis  · Ceftriaxone empirically  · Urine culture pending    Type 2 diabetes mellitus with hyperglycemia, without long-term current use of insulin ([facility] [place] [facility] )  Assessment & Plan  No results found for: HGBA1C    No results for input(s): POCGLU in the last 72 hours      Blood Sugar Average: Last 72 hrs:    · Check hemoglobin A1c  · Glucose 318 on admission  · Sliding scale insulin coverage with Accu-Cheks  · Hypoglycemia protocol  · Metformin on hold    History of laryngectomy  Assessment & Plan  · History laryngectomy  · No speaking valve  · Will obtain more information from Care everywhere when patient is appropriately registered    Alzheimer's dementia [facility])  Assessment & Plan  · History Alzheimer's dementia  · Patient is from [place] and drove to [place] where he was found earlier today 11/11  · Continue Namenda  · Alert and oriented x3 at time of admission after fluid resuscitation and IV antibiotics  · Continue to monitor neuro status    Elevated troponin  Assessment & Plan  · Troponin 72 without EKG changes  · Denies chest pain  · Presumed secondary to urinary tract infection and sepsis  · Monitor  · HX CAD but patient will need to be registered appropriately before we can access Care everywhere for further details  · Continue daily aspirin, atorvastatin    Moderate protein-calorie malnutrition ([facility] )  Assessment & Plan  Malnutrition Findings: Loss of subcutaneous fat in orbital, triceps, ribcage areas  Loss of muscle at temples, clavicles, scapula, extremities  Consultation to dietitian           BMI Findings: Body mass index is 18 8 kg/m²  VTE Pharmacologic Prophylaxis: VTE Score: 4 Moderate Risk (Score 3-4) - Pharmacological DVT Prophylaxis Ordered: enoxaparin (Lovenox)  Code Status: Level 1 - Full Code     Anticipated Length of Stay: Patient will be admitted on an observation basis with an anticipated length of stay of less than 2 midnights secondary to UTI, confusion   Total Time for Visit, including Counseling / Coordination of Care: 30 minutes Greater than 50% of this total time spent on direct patient counseling and coordination of care  Chief Complaint:  Confusion    History of Present Illness:  Tay Cohen   is a 79 y o  male with a PMH of laryngectomy, Alzheimer's, diabetes, bladder cancer, hypertension who presents with altered mental status  Patient was found sitting in his car at a gas pump last evening, he was agitated and altered upon EMS arrival   [facility] police notified patient's family and they were again notified by ER physician  Family reports he lives with them and still drives, occasionally visits family out of state  Last time he was seen by family was 11/9 at 1:00 p m , they have been following his being transactions in [place]  They report his mental status has been declining at home and they have been keeping a very close eye on him    In the ED found to have UTI, hyperglycemia, severe sepsis and presented to the Medical Service for observation admission and case management consultation to assist patient in returning home       Review of Systems:  Review of Systems   Constitutional: Negative for chills and fever  HENT: Negative for ear pain and sore throat  Eyes: Negative for pain and visual disturbance  Respiratory: Negative for cough and shortness of breath  Cardiovascular: Negative for chest pain and palpitations  Gastrointestinal: Negative for abdominal pain and vomiting  Genitourinary: Negative for dysuria and hematuria  Musculoskeletal: Negative for arthralgias and back pain  Skin: Negative for color change and rash  Neurological: Negative for seizures and syncope  Psychiatric/Behavioral: Positive for agitation and confusion  All other systems reviewed and are negative  Past Medical and Surgical History:   Past Medical History:   Diagnosis Date   • Bladder cancer ([facility] )    • Diabetes mellitus ([facility] )    • Hypertension        Past Surgical History:   Procedure Laterality Date   • LARYNGECTOMY         Meds/Allergies:  Prior to Admission medications    Medication Sig Start Date End Date Taking?  Authorizing Provider   allopurinol (ZYLOPRIM) 100 mg tablet Take 200 mg by mouth daily   Yes Historical Provider, MD   aspirin 81 mg chewable tablet Chew 81 mg every other day   Yes Historical Provider, MD   atorvastatin (LIPITOR) 40 mg tablet Take 40 mg by mouth daily   Yes Historical Provider, MD   Empagliflozin (Jardiance) 10 MG TABS tablet Take 10 mg by mouth daily   Yes Historical Provider, MD   ferrous sulfate 325 (65 Fe) mg tablet Take 325 mg by mouth daily with breakfast   Yes Historical Provider, MD   memantine (Namenda) 5 mg tablet Take 5 mg by mouth 2 (two) times a day   Yes Historical Provider, MD   metFORMIN (GLUCOPHAGE) 1000 MG tablet Take 1,000 mg by mouth 2 (two) times a day with meals   Yes Historical Provider, MD metoprolol tartrate (LOPRESSOR) 25 mg tablet Take 12 5 mg by mouth every morning   Yes Historical Provider, MD   pantoprazole (PROTONIX) 40 mg tablet Take 40 mg by mouth daily   Yes Historical Provider, MD   pregabalin (LYRICA) 50 mg capsule Take 50 mg by mouth 2 (two) times a day   Yes Historical Provider, MD     I have reviewed home medications with patient personally  Allergies: No Known Allergies    Social History:  Marital Status: Unknown   Occupation:  Retired  Patient Pre-hospital Living Situation: Home  Patient Pre-hospital Level of Mobility: walks  Patient Pre-hospital Diet Restrictions:  None  Substance Use History:   Social History     Substance and Sexual Activity   Alcohol Use None     Social History     Tobacco Use   Smoking Status Unknown If Ever Smoked   Smokeless Tobacco Not on file     Social History     Substance and Sexual Activity   Drug Use Not on file       Family History:  History reviewed  No pertinent family history  Physical Exam:     Vitals:   Blood Pressure: 130/70 (11/11/22 0726)  Pulse: 81 (11/11/22 0600)  Temperature: 97 9 °F (36 6 °C) (11/11/22 0726)  Temp Source: Temporal (11/11/22 0600)  Respirations: 21 (11/11/22 0726)  Height: 5' 6" (167 6 cm) (11/11/22 0251)  Weight - Scale: 52 8 kg (116 lb 8 oz) (11/11/22 0251)  SpO2: 98 % (11/11/22 0600)    Physical Exam  Vitals and nursing note reviewed  Constitutional:       Appearance: He is well-developed  He is cachectic  He is ill-appearing  HENT:      Head: Normocephalic and atraumatic  Mouth/Throat:      Mouth: Mucous membranes are dry  Eyes:      Conjunctiva/sclera: Conjunctivae normal    Neck:      Comments: Laryngectomy  Cardiovascular:      Rate and Rhythm: Normal rate and regular rhythm  Heart sounds: No murmur heard  Pulmonary:      Effort: Pulmonary effort is normal  No respiratory distress  Breath sounds: Normal breath sounds  Abdominal:      Palpations: Abdomen is soft  Tenderness:  There is no abdominal tenderness  Musculoskeletal:      Cervical back: Neck supple  Skin:     General: Skin is warm and dry  Neurological:      General: No focal deficit present  Mental Status: He is alert and oriented to person, place, and time  Comments: Generalized weakness and muscle atrophy   Psychiatric:         Mood and Affect: Mood normal          Behavior: Behavior is agitated  Cognition and Memory: Memory is impaired  Additional Data:     Lab Results:  Results from last 7 days   Lab Units 11/11/22  0322   WBC Thousand/uL 7 18   HEMOGLOBIN g/dL 16 6   HEMATOCRIT % 47 7   PLATELETS Thousands/uL 204   NEUTROS PCT % 73   LYMPHS PCT % 15   MONOS PCT % 10   EOS PCT % 2     Results from last 7 days   Lab Units 11/11/22  0322   SODIUM mmol/L 134*   POTASSIUM mmol/L 4 4   CHLORIDE mmol/L 95*   CO2 mmol/L 28   BUN mg/dL 19   CREATININE mg/dL 1 27   ANION GAP mmol/L 11   CALCIUM mg/dL 9 6   ALBUMIN g/dL 3 9   TOTAL BILIRUBIN mg/dL 1 48*   ALK PHOS U/L 139*   ALT U/L 35   AST U/L 28   GLUCOSE RANDOM mg/dL 318*                 Results from last 7 days   Lab Units 11/11/22  0549 11/11/22  0322   LACTIC ACID mmol/L 1 4 2 5*       Imaging: Reviewed radiology reports from this admission including: chest xray and CT head  XR chest 2 views   Final Result by Lula Rosales MD (11/11 0730)      No acute cardiopulmonary disease  Workstation performed: [id number]         CT head without contrast   Final Result by West Russo DO (11/11 0421)      No acute intracranial abnormality is seen  Other findings as above  Workstation performed: [id number]             EKG and Other Studies Reviewed on Admission:   · EKG: NSR  HR 94, no ST elevation  ** Please Note: This note has been constructed using a voice recognition system   **

## 2022-11-11 NOTE — OCCUPATIONAL THERAPY NOTE
Occupational Therapy Evaluation     Evaluation: 2517-7164  Treatment: 1061-0641    Patient Name: Jenni Christopher  Today's Date: 11/11/2022  Problem List  Principal Problem:    Acute metabolic encephalopathy  Active Problems:    Acute cystitis    Type 2 diabetes mellitus with hyperglycemia, without long-term current use of insulin (HCC)    Moderate protein-calorie malnutrition (HCC)    Elevated troponin    Severe sepsis (HCC)    Alzheimer's dementia ([facility] )    History of laryngectomy    Past Medical History  Past Medical History:   Diagnosis Date    Bladder cancer ([facility] )     Diabetes mellitus ([facility] )     Hypertension      Past Surgical History  Past Surgical History:   Procedure Laterality Date    LARYNGECTOMY           11/11/22 0945   Note Type   Note type Evaluation   Home Living   Type of [address]   (Pt reports no AD)   Additional Comments Pt poor historian, unable to provide PLOF or home set-up at this time  Pt does report he is independent with ADLs and uses no AD for support   Prior Function   Level of [place] Independent with ADLs; Independent with functional mobility; Independent with IADLS   Lives With [facility] Help From Family   IADLs Independent with driving   ADL   Where Assessed Edge of bed   Grooming Assistance   (SBA - standing at sinkside)   Grooming Deficit Verbal cueing;Supervision/safety; Increased time to complete;Wash/dry hands; Wash/dry face   UB Bathing Assistance 5  Supervision/Setup   UB Bathing Deficit Setup   LB Bathing Assistance   (SBA)   LB Bathing Deficit Verbal cueing;Supervision/safety; Increased time to complete;Perineal area; Buttocks;Right lower leg including foot; Left lower leg including foot   UB Dressing Assistance 5  Supervision/Setup   UB Dressing Deficit Setup;Verbal cueing;Supervision/safety; Increased time to complete   LB Dressing Assistance   (SBA)   LB Dressing Deficit Verbal cueing;Supervision/safety; Increased time to complete; Don/doff R sock;Don/doff L sock; Thread RLE into pants; Thread LLE into pants;Pull up over hips   Toileting Assistance    (SBA)   Toileting Deficit Verbal cueing;Supervison/safety; Increased time to complete;Grab bar use;Perineal hygiene;Clothing management down;Clothing management up   Additional Comments Pt completing ADL tasks while seated at EOB  UB Dressing @ S after set-up  LB Dressing @ SBA including donning socks/pants around feet and CM around waist with no AD for UB support  Please refer to treatment note for performance during toileting and bathing tasks   Bed Mobility   Supine to Sit 5  Supervision   Additional items HOB elevated   Transfers   Sit to Stand   (SBA)   Additional items Increased time required;Verbal cues   Stand to Sit   (SBA)   Additional items Increased time required;Verbal cues   Stand pivot   (SBA)   Additional items Increased time required;Verbal cues   Toilet transfer   (SBA)   Additional items Increased time required;Verbal cues;Standard toilet   Additional Comments Pt completing functional transfers with use of no AD for UB support  SBA for STS and SPT with increased time and vc'ing for safety/technique  Pt offered RW to increased safety and balance, however Pt declined at this time   Balance   Static Sitting Normal   Dynamic Sitting Good   Static Standing Fair +   Dynamic Standing Fair   Activity Tolerance   Activity Tolerance Patient tolerated treatment well   Medical Staff Made Aware Spoe with PT, Prema   Nurse Made Aware Spoke with RNIman Assessment   RUE Assessment WFL   LUE Assessment   LUE Assessment WFL   Hand Function   Gross Motor Coordination Functional   Fine Motor Coordination Functional   Sensation   Light Touch No apparent deficits   Cognition   Overall Cognitive Status Impaired   Arousal/Participation Responsive; Cooperative; Alert   Attention Attends with cues to redirect   Orientation Level Oriented to person;Oriented to place; Disoriented to time;Disoriented to situation   Memory Decreased long term memory;Decreased recall of biographical information   Following Commands Follows one step commands without difficulty   Comments Pt able to follow through with tasks with Min cues PRN although Pt is impaired with LTM and orientation  Pt at this time should not be driving or leaving the home without assistance/supervision   Assessment   Limitation Decreased ADL status; Decreased UE strength;Decreased Safe judgement during ADL;Decreased cognition;Decreased endurance;Decreased self-care trans;Decreased high-level ADLs   Prognosis Good   Assessment Pt is a 79 y o  male, admitted to [address] [place] [address] 11/11/2022 d/t experiencing AMS  Dx: acute metabolic encephalopathy  Pt with PMHx impacting their performance during ADL tasks, including: laryngectomy, alzheimer's, DM, bladder cancer, HTN  Prior to admission to the hospital Pt was performing ADLs without physical assistance  IADLs without physical assistance  Functional transfers/ambulation without physical assistance  Cognitive status was PTA was impaired  OT order placed to assess Pt's ADLs, cognitive status, and performance during functional tasks in order to maximize safety and independence while making most appropriate d/c recommendations   Pt's clinical presentation is currently unstable/unpredictable given new onset deficits that effect Pt's occupational performance and ability to safely return to [facility] including decrease activity tolerance, decrease standing balance, decrease performance during ADL tasks, decrease cognition, decrease safety awareness , increase impulsiveness, decrease UB MS, decrease generalized strength, decrease activity engagement, decrease performance during functional transfers, high fall risk and limited insight to deficits combined with medical complications of change in mental status, abnormal sodium values, abnormal CO2 values, decreased skin integrity, impulsivity during admission, fear/retreat and need for input for mobility technique/safety  Personal factors affecting Pt at time of initial evaluation include: availability as recommended, advanced age, past experience, behavioral pattern, inability to perform current job functions, inability to perform IADLs, inability to navigate community distances, limited insight into impairments, decreased initiation and engagement, recent fall(s)/fall history and questionable non-compliance  Pt will benefit from continued skilled OT services to address deficits as defined above and to maximize level independence/participation during ADLs and functional tasks to facilitate return toward PLOF and improved quality of life  From an occupational therapy standpoint, recommendation at time of d/c would be [address] [place] [address]'[facility] with 24/7  If 24/7 is not available, please consider post acute rehab or assisted  Plan   Treatment Interventions ADL retraining;Functional transfer training;UE strengthening/ROM; Endurance training;Cognitive reorientation;Patient/family training;Equipment evaluation/education; Neuromuscular reeducation; Compensatory technique education;Continued evaluation; Energy conservation; Activityengagement   Goal Expiration Date 11/25/22   OT Treatment Day 1   OT Frequency 3-5x/wk   Recommendation   OT Discharge Recommendation (S)  Home with home health rehabilitation  ([facility] if 24/7 supervision is available)   Additional Comments  (S)  Pt at this time is able to complete ADLs and functional ambulation @ SBA wiht no AD however Pt should not be completing IADLs or community mobility without supervision/assistanace  If 24/7 S is not available at home, please consider post acute rehab or [facility]     AM-PAC Daily Activity Inpatient   Lower Body Dressing 3   Bathing 3   Toileting 3   Upper Body Dressing 3   Grooming 3   Eating 4   Daily Activity Raw Score 19   Daily Activity Standardized Score (Calc for Raw Score >=11) 40 22   AM-PAC Applied Cognition Inpatient   Following a Speech/Presentation 2   Understanding Ordinary Conversation 3   Taking Medications 2   Remembering Where Things Are Placed or Put Away 3   Remembering List of 4-5 Errands 2   Taking Care of Complicated Tasks 2   Applied Cognition Raw Score 14   Applied Cognition Standardized Score 32 02   Additional Treatment Session   Start Time 5810   End Time 1006   Treatment Assessment Pt seen for treatment session #1 this date  Pt alert and agreeable to participate at this time  Pt completing short distance ambulation into restroom with no AD for UB support @ SBA  Pt seated on toilet while completing toileting tasks @ SBA including pericare, CM around waist and STS from toilet with use of grab bar  Pt completing UB bathing @ S after set-up  LB bathing @ SBA with UUB supported PRN  Pt then standing at sinkside to complete hand hygiene @ SBA with UB supported from sinkside PRN  Pt then returned to recliner chair with call bell in reach, chair alarm intact and all needs met at this time  Additional Treatment Day 1     The patient's raw score on the AM-PAC Daily Activity inpatient short form is 19, standardized score is 40 22, greater than 39 4  Patients at this level are likely to benefit from DC to home  Please refer to the recommendation of the Occupational Therapist for safe DC planning  Pt goals to be met by 11/25/2022    Pt will demonstrate ability to complete LB dressing @ S in order to increase safety and independence during meaningful tasks  Pt will demonstrate ability to timoteo/doff socks/shoes while sitting EOB @ S in order to increase safety and independence during meaningful tasks  Pt will demonstrate ability to complete toileting tasks including CM and pericare @ S in order to increase safety and independence during meaningful tasks  Pt will demonstrate ability to complete EOB, chair, toilet/commode transfers @ S in order to increase performance and participation during functional tasks    Pt will demonstrate ability to stand for 10+ minutes while maintaining G balance with use of no AD for UB support  Pt will demonstrate ability to tolerate 30-35 minute OT session with no vc'ing for deep breathing or use of energy conservation techniques in order to increase activity tolerance during functional tasks  Pt will demonstrate Good carryover of use of energy conservation/compensatory strategies during ADLs and functional tasks in order to increase safety and reduce risk for falls  Pt will demonstrate Good attention and participation in continued evaluation of functional ambulation house hold distances in order to assist with safe d/c planning  Pt will attend to continued cognitive assessments 100% of the time in order to provide most appropriate d/c recommendations  Pt will follow 100% simple 1-step commands and be A&O x3 consistently with environmental cues to increase participation in functional activities  Pt will identify 3 areas of interest/hobbies and 1 intervention on how to incorporate into daily life in order to increase interaction with environment and peers as well as increase participation in meaningful tasks  Pt will demonstrate 100% carryover of BUE HEP in order to increase BUE MS and increase performance during functional tasks upon d/c home      Rosario Vyas OTR/L

## 2022-11-11 NOTE — ASSESSMENT & PLAN NOTE
· History laryngectomy  · No speaking valve  · Will obtain more information from Care everywhere when patient is appropriately registered

## 2022-11-11 NOTE — ASSESSMENT & PLAN NOTE
· Severe confusion on top of known dementia in setting of urinary tract infection and sepsis  · Patient found by [facility] police in his car, contacted his family in [place] who stated he left 2 days ago and was unable to be found    · CT head no acute abnormality  · Improving after fluid resuscitation  · monitor neuro status  · PT/OT/case management for assistance in returning patient to his family

## 2022-11-11 NOTE — ASSESSMENT & PLAN NOTE
No results found for: HGBA1C    No results for input(s): POCGLU in the last 72 hours      Blood Sugar Average: Last 72 hrs:    · Check hemoglobin A1c  · Glucose 318 on admission  · Sliding scale insulin coverage with Accu-Cheks  · Hypoglycemia protocol  · Metformin on hold

## 2022-11-11 NOTE — ED NOTES
Received phone call from [facility] who contacted family  Family states has Alzheimer's and he has been missing for 2 days  Family provided with phone number by [employer], will call to provide additional medical information and medications        Rolando Mortensen RN  11/11/22 4742

## 2022-11-12 LAB
ANION GAP SERPL CALCULATED.3IONS-SCNC: 7 MMOL/L (ref 4–13)
BASOPHILS # BLD AUTO: 0.04 THOUSANDS/ÂΜL (ref 0–0.1)
BASOPHILS NFR BLD AUTO: 1 % (ref 0–1)
BUN SERPL-MCNC: 25 MG/DL (ref 5–25)
CALCIUM SERPL-MCNC: 8 MG/DL (ref 8.3–10.1)
CHLORIDE SERPL-SCNC: 105 MMOL/L (ref 96–108)
CO2 SERPL-SCNC: 26 MMOL/L (ref 21–32)
CREAT SERPL-MCNC: 1.04 MG/DL (ref 0.6–1.3)
EOSINOPHIL # BLD AUTO: 0.15 THOUSAND/ÂΜL (ref 0–0.61)
EOSINOPHIL NFR BLD AUTO: 3 % (ref 0–6)
ERYTHROCYTE [DISTWIDTH] IN BLOOD BY AUTOMATED COUNT: 13.1 % (ref 11.6–15.1)
GFR SERPL CREATININE-BSD FRML MDRD: 66 ML/MIN/1.73SQ M
GLUCOSE SERPL-MCNC: 189 MG/DL (ref 65–140)
GLUCOSE SERPL-MCNC: 203 MG/DL (ref 65–140)
GLUCOSE SERPL-MCNC: 219 MG/DL (ref 65–140)
GLUCOSE SERPL-MCNC: 245 MG/DL (ref 65–140)
GLUCOSE SERPL-MCNC: 271 MG/DL (ref 65–140)
HCT VFR BLD AUTO: 35.2 % (ref 36.5–49.3)
HGB BLD-MCNC: 11.9 G/DL (ref 12–17)
IMM GRANULOCYTES # BLD AUTO: 0.03 THOUSAND/UL (ref 0–0.2)
IMM GRANULOCYTES NFR BLD AUTO: 1 % (ref 0–2)
LYMPHOCYTES # BLD AUTO: 1.22 THOUSANDS/ÂΜL (ref 0.6–4.47)
LYMPHOCYTES NFR BLD AUTO: 23 % (ref 14–44)
MAGNESIUM SERPL-MCNC: 1.4 MG/DL (ref 1.6–2.6)
MCH RBC QN AUTO: 28.7 PG (ref 26.8–34.3)
MCHC RBC AUTO-ENTMCNC: 33.8 G/DL (ref 31.4–37.4)
MCV RBC AUTO: 85 FL (ref 82–98)
MONOCYTES # BLD AUTO: 0.61 THOUSAND/ÂΜL (ref 0.17–1.22)
MONOCYTES NFR BLD AUTO: 11 % (ref 4–12)
NEUTROPHILS # BLD AUTO: 3.28 THOUSANDS/ÂΜL (ref 1.85–7.62)
NEUTS SEG NFR BLD AUTO: 61 % (ref 43–75)
NRBC BLD AUTO-RTO: 0 /100 WBCS
PLATELET # BLD AUTO: 157 THOUSANDS/UL (ref 149–390)
PMV BLD AUTO: 9.8 FL (ref 8.9–12.7)
POTASSIUM SERPL-SCNC: 3.7 MMOL/L (ref 3.5–5.3)
RBC # BLD AUTO: 4.14 MILLION/UL (ref 3.88–5.62)
SODIUM SERPL-SCNC: 138 MMOL/L (ref 135–147)
WBC # BLD AUTO: 5.33 THOUSAND/UL (ref 4.31–10.16)

## 2022-11-12 RX ORDER — INSULIN GLARGINE 100 [IU]/ML
15 INJECTION, SOLUTION SUBCUTANEOUS
Status: DISCONTINUED | OUTPATIENT
Start: 2022-11-12 | End: 2022-11-13 | Stop reason: HOSPADM

## 2022-11-12 RX ADMIN — SODIUM CHLORIDE 100 ML/HR: 0.9 INJECTION, SOLUTION INTRAVENOUS at 19:59

## 2022-11-12 RX ADMIN — INSULIN LISPRO 1 UNITS: 100 INJECTION, SOLUTION INTRAVENOUS; SUBCUTANEOUS at 09:25

## 2022-11-12 RX ADMIN — MEMANTINE 5 MG: 5 TABLET ORAL at 16:37

## 2022-11-12 RX ADMIN — PREGABALIN 50 MG: 50 CAPSULE ORAL at 16:37

## 2022-11-12 RX ADMIN — ENOXAPARIN SODIUM 30 MG: 30 INJECTION SUBCUTANEOUS at 09:22

## 2022-11-12 RX ADMIN — PANTOPRAZOLE SODIUM 40 MG: 40 TABLET, DELAYED RELEASE ORAL at 05:56

## 2022-11-12 RX ADMIN — SODIUM CHLORIDE 100 ML/HR: 0.9 INJECTION, SOLUTION INTRAVENOUS at 10:11

## 2022-11-12 RX ADMIN — ALLOPURINOL 200 MG: 100 TABLET ORAL at 09:23

## 2022-11-12 RX ADMIN — MEMANTINE 5 MG: 5 TABLET ORAL at 09:22

## 2022-11-12 RX ADMIN — CEFTRIAXONE 1000 MG: 1 INJECTION, SOLUTION INTRAVENOUS at 04:17

## 2022-11-12 RX ADMIN — INSULIN GLARGINE 15 UNITS: 100 INJECTION, SOLUTION SUBCUTANEOUS at 23:14

## 2022-11-12 RX ADMIN — INSULIN LISPRO 2 UNITS: 100 INJECTION, SOLUTION INTRAVENOUS; SUBCUTANEOUS at 23:14

## 2022-11-12 RX ADMIN — ATORVASTATIN CALCIUM 40 MG: 40 TABLET, FILM COATED ORAL at 09:22

## 2022-11-12 RX ADMIN — FERROUS SULFATE TAB 325 MG (65 MG ELEMENTAL FE) 325 MG: 325 (65 FE) TAB at 09:23

## 2022-11-12 RX ADMIN — PREGABALIN 50 MG: 50 CAPSULE ORAL at 09:22

## 2022-11-12 RX ADMIN — INSULIN LISPRO 1 UNITS: 100 INJECTION, SOLUTION INTRAVENOUS; SUBCUTANEOUS at 16:37

## 2022-11-12 RX ADMIN — Medication 12.5 MG: at 09:31

## 2022-11-12 RX ADMIN — INSULIN LISPRO 2 UNITS: 100 INJECTION, SOLUTION INTRAVENOUS; SUBCUTANEOUS at 11:34

## 2022-11-12 NOTE — PROGRESS NOTES
[address]  Progress Note - Clifford Reynolds Sr  1940, 79 y o  male MRN: [id number]  Unit/Bed#: -Brigitte Encounter: [id number]  Primary Care Provider: No primary care provider on file  Date and time admitted to hospital: 11/11/2022  2:47 AM    History of laryngectomy  Assessment & Plan  · History laryngectomy  · No speaking valve but able to communicate and express himself by mouthing and writing    [facility]'s [facility] ([facility] )  Assessment & Plan  · History Alzheimer's dementia  · Patient is from [place] and drove to [place] where he was found   · Continue Namenda  · Alert and oriented x3 at time of admission after fluid resuscitation and IV antibiotics  · Continue to monitor neuro status - seems to be at baseline    Severe sepsis ([facility] )  Assessment & Plan  · Tachycardia, tachypnea, lactic acid elevation 2 5  · Urinary source of infection  · Empiric ceftriaxone  · Cultures pending  · IV fluid resuscitation  · Afebrile with normal WBC since admission    Elevated troponin  Assessment & Plan  · Troponin 72 without EKG changes  · Denies chest pain  · Presumed secondary to urinary tract infection and sepsis  · Monitor  · HX CAD but patient will need to be registered appropriately before we can access Care everywhere for further details  · Continue daily aspirin, atorvastatin    Moderate protein-calorie malnutrition ([facility] )  Assessment & Plan  Malnutrition Findings: Loss of subcutaneous fat in orbital, triceps, ribcage areas  Loss of muscle at temples, clavicles, scapula, extremities  Consultation to dietitian           BMI Findings: Body mass index is 18 8 kg/m²         Type 2 diabetes mellitus with hyperglycemia, without long-term current use of insulin [facility])  Assessment & Plan  Lab Results   Component Value Date    HGBA1C 8 9 (H) 11/11/2022       Recent Labs     11/11/22  1113 11/11/22  1623 11/11/22 2051 11/12/22  0732   POCGLU 272* 205* 209* 203*       Blood Sugar Average: Last 72 hrs:  (P) 237 4  · Glucose 318 on admission   · Sliding scale insulin coverage with Accu-Cheks + basal insulin for improved glycemic control in setting of infection  · Titrate as needed  · Hypoglycemia protocol  · Metformin on hold    Acute cystitis  Assessment & Plan  · Innumerable pyuria on at urinalysis  · Ceftriaxone empirically  · Urine culture pending    * Acute metabolic encephalopathy  Assessment & Plan  · Severe confusion on top of known dementia in setting of urinary tract infection and sepsis  · Patient found by [facility] police in his car, contacted his family in [place] who stated he left 2 days ago and was unable to be found  · CT head no acute abnormality  · Improving after fluid resuscitation  · monitor neuro status  · PT/OT/case management for assistance in returning patient to his family - patient will need to be under 24/7 surveillance once he gets back home  · Back to baseline mental status        VTE Pharmacologic Prophylaxis: VTE Score: 4 Moderate Risk (Score 3-4) - Pharmacological DVT Prophylaxis Ordered: enoxaparin (Lovenox)  Patient Centered Rounds: I performed bedside rounds with nursing staff today  Discussions with Specialists or Other Care Team Provider: PT    Education and Discussions with Family / Patient: Updated  (Granddaughter) via phone  Time Spent for Care: 30 minutes  More than 50% of total time spent on counseling and coordination of care as described above  Current Length of Stay: 1 day(s)  Current Patient Status: Inpatient   Certification Statement: The patient will continue to require additional inpatient hospital stay due to Pending urine cultures  Discharge Plan: Anticipate discharge in 24-48 hrs to Back to [place]    Code Status: Level 1 - Full Code    Subjective:   Patient seen and examined at bedside  Denies any complaints at this time    Endorses that he had wanted to get out of the house when he had left on Wednesday but had no idea how he got to [place]  He states that he does this once in a while and drives around  Also unaware of where he is currently and what date it is  Objective:     Vitals:   Temp (24hrs), Av 9 °F (36 6 °C), Min:97 9 °F (36 6 °C), Max:97 9 °F (36 6 °C)    Temp:  [97 9 °F (36 6 °C)] 97 9 °F (36 6 °C)  HR:  [54-70] 70  Resp:  [18-20] 18  BP: ()/(45-56) 120/54  SpO2:  [95 %-96 %] 95 %  Body mass index is 18 8 kg/m²  Input and Output Summary (last 24 hours): Intake/Output Summary (Last 24 hours) at 2022 1054  Last data filed at 2022 0848  Gross per 24 hour   Intake 360 ml   Output --   Net 360 ml       Physical Exam:   Physical Exam  Vitals and nursing note reviewed  Constitutional:       Appearance: He is well-developed  HENT:      Head: Normocephalic and atraumatic  Eyes:      Conjunctiva/sclera: Conjunctivae normal    Cardiovascular:      Rate and Rhythm: Normal rate and regular rhythm  Heart sounds: No murmur heard  Pulmonary:      Effort: Pulmonary effort is normal  No respiratory distress  Breath sounds: Normal breath sounds  Abdominal:      Palpations: Abdomen is soft  Tenderness: There is no abdominal tenderness  Musculoskeletal:      Cervical back: Neck supple  Skin:     General: Skin is warm and dry  Neurological:      General: No focal deficit present  Mental Status: He is alert        Comments: A&O x1 which is patient's baseline            Additional Data:     Labs:  Results from last 7 days   Lab Units 22  0432   WBC Thousand/uL 5 33   HEMOGLOBIN g/dL 11 9*   HEMATOCRIT % 35 2*   PLATELETS Thousands/uL 157   NEUTROS PCT % 61   LYMPHS PCT % 23   MONOS PCT % 11   EOS PCT % 3     Results from last 7 days   Lab Units 22  0432 22  0322   SODIUM mmol/L 138 134*   POTASSIUM mmol/L 3 7 4 4   CHLORIDE mmol/L 105 95*   CO2 mmol/L 26 28   BUN mg/dL 25 19   CREATININE mg/dL 1 04 1 27   ANION GAP mmol/L 7 11   CALCIUM mg/dL 8  0* 9 6   ALBUMIN g/dL  --  3 9   TOTAL BILIRUBIN mg/dL  --  1 48*   ALK PHOS U/L  --  139*   ALT U/L  --  35   AST U/L  --  28   GLUCOSE RANDOM mg/dL 189* 318*         Results from last 7 days   Lab Units 11/12/22  0732 11/11/22  2051 11/11/22  1623 11/11/22  1113 11/11/22  0926   POC GLUCOSE mg/dl 203* 209* 205* 272* 298*     Results from last 7 days   Lab Units 11/11/22  0322   HEMOGLOBIN A1C % 8 9*     Results from last 7 days   Lab Units 11/11/22  0549 11/11/22  0322   LACTIC ACID mmol/L 1 4 2 5*       Lines/Drains:  Invasive Devices  Report    Peripheral Intravenous Line  Duration           Peripheral IV 11/11/22 Right Antecubital 1 day                      Imaging:  Reviewed    Recent Cultures (last 7 days):   Results from last 7 days   Lab Units 11/11/22  0322   BLOOD CULTURE  No Growth at 24 hrs  No Growth at 24 hrs         Last 24 Hours Medication List:   Current Facility-Administered Medications   Medication Dose Route Frequency Provider Last Rate   • acetaminophen  650 mg Oral Q6H PRN Sharonda S Ad, CRNP     • allopurinol  200 mg Oral Daily Sharonda S Ad, CRNP     • aspirin  81 mg Oral Every Other Day Sharonda S Ad, CRNP     • atorvastatin  40 mg Oral Daily Sharonda S Ad, CRNP     • cefTRIAXone  1,000 mg Intravenous Q24H Susanne Hart MD 1,000 mg (11/12/22 0417)   • enoxaparin  30 mg Subcutaneous Daily Sharonda S Ad, CRNP     • ferrous sulfate  325 mg Oral Daily With Breakfast Sharonda S Ad, CRNP     • insulin glargine  15 Units Subcutaneous HS Lobito Hart MD     • insulin lispro  1-5 Units Subcutaneous TID AC Sharonda S Ad, CRNP     • insulin lispro  1-5 Units Subcutaneous HS Sharonda S Ad, CRNP     • memantine  5 mg Oral BID Sharonda S Ad, CRNP     • metoprolol tartrate  12 5 mg Oral QAM Sharonda S Ad, CRNP     • pantoprazole  40 mg Oral Early Morning Sharonda S Ad, CRNP     • pregabalin  50 mg Oral BID Sharonda S Ad, CRNP     • sodium chloride  100 mL/hr Intravenous Continuous Sharonda S Ad, CRNP 100 mL/hr (11/12/22 1011)        Today, Patient Was Seen By: Nichole Zuniga    **Please Note: This note may have been constructed using a voice recognition system  **

## 2022-11-12 NOTE — ASSESSMENT & PLAN NOTE
Lab Results   Component Value Date    HGBA1C 8 9 (H) 11/11/2022       Recent Labs     11/11/22  1113 11/11/22  1623 11/11/22 2051 11/12/22  0732   POCGLU 272* 205* 209* 203*       Blood Sugar Average: Last 72 hrs:  (P) 237 4  · Glucose 318 on admission   · Sliding scale insulin coverage with Accu-Cheks + basal insulin for improved glycemic control in setting of infection  · Titrate as needed  · Hypoglycemia protocol  · Metformin on hold

## 2022-11-12 NOTE — ASSESSMENT & PLAN NOTE
· Tachycardia, tachypnea, lactic acid elevation 2 5  · Urinary source of infection  · Empiric ceftriaxone  · Cultures pending  · IV fluid resuscitation  · Afebrile with normal WBC since admission

## 2022-11-12 NOTE — ASSESSMENT & PLAN NOTE
· Severe confusion on top of known dementia in setting of urinary tract infection and sepsis  · Patient found by [facility] police in his car, contacted his family in [place] who stated he left 2 days ago and was unable to be found    · CT head no acute abnormality  · Improving after fluid resuscitation  · monitor neuro status  · PT/OT/case management for assistance in returning patient to his family - patient will need to be under 24/7 surveillance once he gets back home  · Back to baseline mental status

## 2022-11-12 NOTE — PROGRESS NOTES
Patient removed trach button  Respiratory made aware and stated to apply 4x4 gauze and tape over stoma site

## 2022-11-12 NOTE — ASSESSMENT & PLAN NOTE
· History laryngectomy  · No speaking valve but able to communicate and express himself by mouthing and writing

## 2022-11-12 NOTE — PLAN OF CARE
Problem: MOBILITY - ADULT  Goal: Maintain or return to baseline ADL function  Description: INTERVENTIONS:  -  Assess patient's ability to carry out ADLs; assess patient's baseline for ADL function and identify physical deficits which impact ability to perform ADLs (bathing, care of mouth/teeth, toileting, grooming, dressing, etc )  - Assess/evaluate cause of self-care deficits   - Assess range of motion  - Assess patient's mobility; develop plan if impaired  - Assess patient's need for assistive devices and provide as appropriate  - Encourage maximum independence but intervene and supervise when necessary  - Involve family in performance of ADLs  - Assess for home care needs following discharge   - Consider OT consult to assist with ADL evaluation and planning for discharge  - Provide patient education as appropriate  11/11/2022 1946 by Radha Varghese RN  Outcome: Progressing  11/11/2022 1946 by Radha Varghese RN  Outcome: Progressing  Goal: Maintains/Returns to pre admission functional level  Description: INTERVENTIONS:  - Perform BMAT or MOVE assessment daily    - Set and communicate daily mobility goal to care team and patient/family/caregiver  - Collaborate with rehabilitation services on mobility goals if consulted  - Perform Range of Motion - times a day  - Reposition patient every - hours    - Dangle patient - times a day  - Stand patient - times a day  - Ambulate patient - times a day  - Out of bed to chair - times a day   - Out of bed for meals - times a day  - Out of bed for toileting  - Record patient progress and toleration of activity level   11/11/2022 1946 by Radha Varghese RN  Outcome: Progressing  11/11/2022 1946 by Radha Varghese RN  Outcome: Progressing     Problem: PAIN - ADULT  Goal: Verbalizes/displays adequate comfort level or baseline comfort level  Description: Interventions:  - Encourage patient to monitor pain and request assistance  - Assess pain using appropriate pain scale  - Administer analgesics based on type and severity of pain and evaluate response  - Implement non-pharmacological measures as appropriate and evaluate response  - Consider cultural and social influences on pain and pain management  - Notify physician/advanced practitioner if interventions unsuccessful or patient reports new pain  11/11/2022 1946 by Nii Dove RN  Outcome: Progressing  11/11/2022 1946 by Nii Dove RN  Outcome: Progressing     Problem: INFECTION - ADULT  Goal: Absence or prevention of progression during hospitalization  Description: INTERVENTIONS:  - Assess and monitor for signs and symptoms of infection  - Monitor lab/diagnostic results  - Monitor all insertion sites, i e  indwelling lines, tubes, and drains  - Monitor endotracheal if appropriate and nasal secretions for changes in amount and color  - [place] appropriate cooling/warming therapies per order  - Administer medications as ordered  - Instruct and encourage patient and family to use good hand hygiene technique  - Identify and instruct in appropriate isolation precautions for identified infection/condition  11/11/2022 1946 by Nii Dove RN  Outcome: Progressing  11/11/2022 1946 by Nii Dove RN  Outcome: Progressing     Problem: SAFETY ADULT  Goal: Maintain or return to baseline ADL function  Description: INTERVENTIONS:  -  Assess patient's ability to carry out ADLs; assess patient's baseline for ADL function and identify physical deficits which impact ability to perform ADLs (bathing, care of mouth/teeth, toileting, grooming, dressing, etc )  - Assess/evaluate cause of self-care deficits   - Assess range of motion  - Assess patient's mobility; develop plan if impaired  - Assess patient's need for assistive devices and provide as appropriate  - Encourage maximum independence but intervene and supervise when necessary  - Involve family in performance of ADLs  - Assess for home care needs following discharge   - Consider OT consult to assist with ADL evaluation and planning for discharge  - Provide patient education as appropriate  11/11/2022 1946 by Radha Varghese RN  Outcome: Progressing  11/11/2022 1946 by Radha Varghese RN  Outcome: Progressing  Goal: Maintains/Returns to pre admission functional level  Description: INTERVENTIONS:  - Perform BMAT or MOVE assessment daily    - Set and communicate daily mobility goal to care team and patient/family/caregiver  - Collaborate with rehabilitation services on mobility goals if consulted  - Perform Range of Motion - times a day  - Reposition patient every - hours    - Dangle patient - times a day  - Stand patient - times a day  - Ambulate patient - times a day  - Out of bed to chair - times a day   - Out of bed for meals - times a day  - Out of bed for toileting  - Record patient progress and toleration of activity level   11/11/2022 1946 by Radha Varghese RN  Outcome: Progressing  11/11/2022 1946 by Radha Varghese RN  Outcome: Progressing  Goal: Patient will remain free of falls  Description: INTERVENTIONS:  - Educate patient/family on patient safety including physical limitations  - Instruct patient to call for assistance with activity   - Consult OT/PT to assist with strengthening/mobility   - Keep Call bell within reach  - Keep bed low and locked with side rails adjusted as appropriate  - Keep care items and personal belongings within reach  - Initiate and maintain comfort rounds  - Make Fall Risk Sign visible to staff  - Offer Toileting every 2 Hours, in advance of need  - Initiate/Maintain alarm  - Obtain necessary fall risk management equipment  - Apply yellow socks and bracelet for high fall risk patients  - Consider moving patient to room near nurses station  11/11/2022 1946 by Radha Varghese RN  Outcome: Progressing  11/11/2022 1946 by Radha Varghese RN  Outcome: Progressing     Problem: DISCHARGE PLANNING  Goal: Discharge to home or other facility with appropriate resources  Description: INTERVENTIONS:  - Identify barriers to discharge w/patient and caregiver  - Arrange for needed discharge resources and transportation as appropriate  - Identify discharge learning needs (meds, wound care, etc )  - Arrange for interpretive services to assist at discharge as needed  - Refer to Case Management Department for coordinating discharge planning if the patient needs post-hospital services based on physician/advanced practitioner order or complex needs related to functional status, cognitive ability, or social support system  11/11/2022 1946 by Nii Dove RN  Outcome: Progressing  11/11/2022 1946 by Nii Dove RN  Outcome: Progressing     Problem: Knowledge Deficit  Goal: Patient/family/caregiver demonstrates understanding of disease process, treatment plan, medications, and discharge instructions  Description: Complete learning assessment and assess knowledge base    Interventions:  - Provide teaching at level of understanding  - Provide teaching via preferred learning methods  11/11/2022 1946 by Nii Dove RN  Outcome: Progressing  11/11/2022 1946 by Nii Dove RN  Outcome: Progressing     Problem: NEUROSENSORY - ADULT  Goal: Achieves stable or improved neurological status  Description: INTERVENTIONS  - Monitor and report changes in neurological status  - Monitor vital signs such as temperature, blood pressure, glucose, and any other labs ordered   - Initiate measures to prevent increased intracranial pressure  - Monitor for seizure activity and implement precautions if appropriate      11/11/2022 1946 by Nii Dove RN  Outcome: Progressing  11/11/2022 1946 by Nii Dove RN  Outcome: Progressing  Goal: Remains free of injury related to seizures activity  Description: INTERVENTIONS  - Maintain airway, patient safety  and administer oxygen as ordered  - Monitor patient for seizure activity, document and report duration and description of seizure to physician/advanced practitioner  - If seizure occurs,  ensure patient safety during seizure  - Reorient patient post seizure  - Seizure pads on all 4 side rails  - Instruct patient/family to notify RN of any seizure activity including if an aura is experienced  - Instruct patient/family to call for assistance with activity based on nursing assessment  - Administer anti-seizure medications if ordered    11/11/2022 1946 by Bebeto Bergman RN  Outcome: Progressing  11/11/2022 1946 by Bebeto Bergman RN  Outcome: Progressing  Goal: Achieves maximal functionality and self care  Description: INTERVENTIONS  - Monitor swallowing and airway patency with patient fatigue and changes in neurological status  - Encourage and assist patient to increase activity and self care     - Encourage visually impaired, hearing impaired and aphasic patients to use assistive/communication devices  11/11/2022 1946 by Bebeto Bergman RN  Outcome: Progressing  11/11/2022 1946 by Bebeto Bergman RN  Outcome: Progressing     Problem: Prexisting or High Potential for Compromised Skin Integrity  Goal: Skin integrity is maintained or improved  Description: INTERVENTIONS:  - Identify patients at risk for skin breakdown  - Assess and monitor skin integrity  - Assess and monitor nutrition and hydration status  - Monitor labs   - Assess for incontinence   - Turn and reposition patient  - Assist with mobility/ambulation  - Relieve pressure over bony prominences  - Avoid friction and shearing  - Provide appropriate hygiene as needed including keeping skin clean and dry  - Evaluate need for skin moisturizer/barrier cream  - Collaborate with interdisciplinary team   - Patient/family teaching  - Consider wound care consult   11/11/2022 1946 by Bebeto Bergman RN  Outcome: Progressing  11/11/2022 1946 by Bebeto Bergman RN  Outcome: Progressing

## 2022-11-12 NOTE — PLAN OF CARE
Problem: MOBILITY - ADULT  Goal: Maintain or return to baseline ADL function  Description: INTERVENTIONS:  -  Assess patient's ability to carry out ADLs; assess patient's baseline for ADL function and identify physical deficits which impact ability to perform ADLs (bathing, care of mouth/teeth, toileting, grooming, dressing, etc )  - Assess/evaluate cause of self-care deficits   - Assess range of motion  - Assess patient's mobility; develop plan if impaired  - Assess patient's need for assistive devices and provide as appropriate  - Encourage maximum independence but intervene and supervise when necessary  - Involve family in performance of ADLs  - Assess for home care needs following discharge   - Consider OT consult to assist with ADL evaluation and planning for discharge  - Provide patient education as appropriate  Outcome: Progressing  Goal: Maintains/Returns to pre admission functional level  Description: INTERVENTIONS:  - Perform BMAT or MOVE assessment daily    - Set and communicate daily mobility goal to care team and patient/family/caregiver  - Collaborate with rehabilitation services on mobility goals if consulted  - Perform Range of Motion 2 times a day  - Reposition patient every 2 hours    - Dangle patient 2 times a day  - Stand patient 2 times a day  - Ambulate patient 2 times a day  - Out of bed to chair 2 times a day   - Out of bed for meals 2 times a day  - Out of bed for toileting  - Record patient progress and toleration of activity level   Outcome: Progressing     Problem: PAIN - ADULT  Goal: Verbalizes/displays adequate comfort level or baseline comfort level  Description: Interventions:  - Encourage patient to monitor pain and request assistance  - Assess pain using appropriate pain scale  - Administer analgesics based on type and severity of pain and evaluate response  - Implement non-pharmacological measures as appropriate and evaluate response  - Consider cultural and social influences on pain and pain management  - Notify physician/advanced practitioner if interventions unsuccessful or patient reports new pain  Outcome: Progressing     Problem: INFECTION - ADULT  Goal: Absence or prevention of progression during hospitalization  Description: INTERVENTIONS:  - Assess and monitor for signs and symptoms of infection  - Monitor lab/diagnostic results  - Monitor all insertion sites, i e  indwelling lines, tubes, and drains  - Monitor endotracheal if appropriate and nasal secretions for changes in amount and color  - [place] appropriate cooling/warming therapies per order  - Administer medications as ordered  - Instruct and encourage patient and family to use good hand hygiene technique  - Identify and instruct in appropriate isolation precautions for identified infection/condition  Outcome: Progressing     Problem: SAFETY ADULT  Goal: Maintain or return to baseline ADL function  Description: INTERVENTIONS:  -  Assess patient's ability to carry out ADLs; assess patient's baseline for ADL function and identify physical deficits which impact ability to perform ADLs (bathing, care of mouth/teeth, toileting, grooming, dressing, etc )  - Assess/evaluate cause of self-care deficits   - Assess range of motion  - Assess patient's mobility; develop plan if impaired  - Assess patient's need for assistive devices and provide as appropriate  - Encourage maximum independence but intervene and supervise when necessary  - Involve family in performance of ADLs  - Assess for home care needs following discharge   - Consider OT consult to assist with ADL evaluation and planning for discharge  - Provide patient education as appropriate  Outcome: Progressing  Goal: Maintains/Returns to pre admission functional level  Description: INTERVENTIONS:  - Perform BMAT or MOVE assessment daily    - Set and communicate daily mobility goal to care team and patient/family/caregiver     - Collaborate with rehabilitation services on mobility goals if consulted  - Perform Range of Motion 2 times a day  - Reposition patient every 2 hours    - Dangle patient 2 times a day  - Stand patient 2 times a day  - Ambulate patient 2 times a day  - Out of bed to chair 2 times a day   - Out of bed for meals 2 times a day  - Out of bed for toileting  - Record patient progress and toleration of activity level   Outcome: Progressing  Goal: Patient will remain free of falls  Description: INTERVENTIONS:  - Educate patient/family on patient safety including physical limitations  - Instruct patient to call for assistance with activity   - Consult OT/PT to assist with strengthening/mobility   - Keep Call bell within reach  - Keep bed low and locked with side rails adjusted as appropriate  - Keep care items and personal belongings within reach  - Initiate and maintain comfort rounds  - Make Fall Risk Sign visible to staff  - Offer Toileting every 2 Hours, in advance of need  - Initiate/Maintain 2alarm  - Obtain necessary fall risk management equipment: 2  - Apply yellow socks and bracelet for high fall risk patients  - Consider moving patient to room near nurses station  Outcome: Progressing     Problem: DISCHARGE PLANNING  Goal: Discharge to home or other facility with appropriate resources  Description: INTERVENTIONS:  - Identify barriers to discharge w/patient and caregiver  - Arrange for needed discharge resources and transportation as appropriate  - Identify discharge learning needs (meds, wound care, etc )  - Arrange for interpretive services to assist at discharge as needed  - Refer to Case Management Department for coordinating discharge planning if the patient needs post-hospital services based on physician/advanced practitioner order or complex needs related to functional status, cognitive ability, or social support system  Outcome: Progressing     Problem: Knowledge Deficit  Goal: Patient/family/caregiver demonstrates understanding of disease process, treatment plan, medications, and discharge instructions  Description: Complete learning assessment and assess knowledge base  Interventions:  - Provide teaching at level of understanding  - Provide teaching via preferred learning methods  Outcome: Progressing     Problem: NEUROSENSORY - ADULT  Goal: Achieves stable or improved neurological status  Description: INTERVENTIONS  - Monitor and report changes in neurological status  - Monitor vital signs such as temperature, blood pressure, glucose, and any other labs ordered   - Initiate measures to prevent increased intracranial pressure  - Monitor for seizure activity and implement precautions if appropriate      Outcome: Progressing  Goal: Remains free of injury related to seizures activity  Description: INTERVENTIONS  - Maintain airway, patient safety  and administer oxygen as ordered  - Monitor patient for seizure activity, document and report duration and description of seizure to physician/advanced practitioner  - If seizure occurs,  ensure patient safety during seizure  - Reorient patient post seizure  - Seizure pads on all 4 side rails  - Instruct patient/family to notify RN of any seizure activity including if an aura is experienced  - Instruct patient/family to call for assistance with activity based on nursing assessment  - Administer anti-seizure medications if ordered    Outcome: Progressing  Goal: Achieves maximal functionality and self care  Description: INTERVENTIONS  - Monitor swallowing and airway patency with patient fatigue and changes in neurological status  - Encourage and assist patient to increase activity and self care     - Encourage visually impaired, hearing impaired and aphasic patients to use assistive/communication devices  Outcome: Progressing     Problem: Prexisting or High Potential for Compromised Skin Integrity  Goal: Skin integrity is maintained or improved  Description: INTERVENTIONS:  - Identify patients at risk for skin breakdown  - Assess and monitor skin integrity  - Assess and monitor nutrition and hydration status  - Monitor labs   - Assess for incontinence   - Turn and reposition patient  - Assist with mobility/ambulation  - Relieve pressure over bony prominences  - Avoid friction and shearing  - Provide appropriate hygiene as needed including keeping skin clean and dry  - Evaluate need for skin moisturizer/barrier cream  - Collaborate with interdisciplinary team   - Patient/family teaching  - Consider wound care consult   Outcome: Progressing     Problem: Potential for Falls  Goal: Patient will remain free of falls  Description: INTERVENTIONS:  - Educate patient/family on patient safety including physical limitations  - Instruct patient to call for assistance with activity   - Consult OT/PT to assist with strengthening/mobility   - Keep Call bell within reach  - Keep bed low and locked with side rails adjusted as appropriate  - Keep care items and personal belongings within reach  - Initiate and maintain comfort rounds  - Make Fall Risk Sign visible to staff  - Offer Toileting every 2 Hours, in advance of need  - Initiate/Maintain 2alarm  - Obtain necessary fall risk management equipment: 2  - Apply yellow socks and bracelet for high fall risk patients  - Consider moving patient to room near nurses station  Outcome: Progressing

## 2022-11-12 NOTE — ASSESSMENT & PLAN NOTE
· History Alzheimer's dementia  · Patient is from [place] and drove to [place] where he was found   · Continue [place]  · Alert and oriented x3 at time of admission after fluid resuscitation and IV antibiotics  · Continue to monitor neuro status - seems to be at baseline

## 2022-11-12 NOTE — UTILIZATION REVIEW
Initial Clinical Review    Admission: Date/Time/Statement:   Admission Orders (From admission, onward)     Ordered        11/11/22 1301  Inpatient Admission  Once                Orders Placed This Encounter   Procedures   • Inpatient Admission     Standing Status:   Standing     Number of Occurrences:   1     Order Specific Question:   Level of Care     Answer:   Med Surg [16]     Order Specific Question:   Estimated length of stay     Answer:   More than 2 Midnights     Order Specific Question:   Certification     Answer:   I certify that inpatient services are medically necessary for this patient for a duration of greater than two midnights  See H&P and MD Progress Notes for additional information about the patient's course of treatment  ED Arrival Information     Expected   -    Arrival   11/11/2022 02:47    Acuity   Emergent            Means of arrival   Ambulance    Escorted by   [facility]    Service   Hospitalist    Admission type   Emergency            Arrival complaint   -           Chief Complaint   Patient presents with   • Altered Mental Status     Patient found sitting in parked car at gas pump since 2200  Agitated and altered upon EMS and state police arrival  From [place]  Initial Presentation: 79 y o  male with PMHx of bladder cancer undergoing therapy with Urology, T2 DM, h/o laryngectomy, beginning Alzheimer's dementia brought to ED by EMS after having been found at a gas station in his car confused and agitated  Pt lives in [place] and per granddaughter who he lives with, he sometimes takes the car for a drive, but he left the house sometime on Wednesday and had not heard from pt since  On questioning pt has no complaints, said went for a drive but is unaware of how he ended up at a gas pump last night in [place]  Pt has h/o dementia but has waxing and waning memory but has never exhibited any episodes this bad  In ED CTH neg, UA pos for UTI  Na 134, Glucose 318    Mental status improving after IVFs in ED  IV abx given  Admit inpatient to M/S/Tele unit with Acute metabolic encephalopathy, severe sepsis, acute cystitis  Telemetry monitoring, neuro checks q4h  Continue IV abx  Ucx pending  Monitor temps, WBCs  Cons carb diet  Accuchecks w/ ssi  Continue pta asa and atorvastatin  Date: 11/12 Day 2:  Pt mental status appears to be improving  Currently A&O x1, which is baseline  Pt without his speaking valve but able to communicate and express himself by mouthing and writing  Afebrile, WBCs wnl  Continue IV abx  Blood sugars remain elevated, increase basal insulin, continue accuchecks w/ ssi  SCDs  PT/OT evals    Per granddaughter will be coming to [place] to drive pt home when medically cleared for d/c         ED Triage Vitals [11/11/22 0251]   Temperature Pulse Respirations Blood Pressure SpO2   (!) 97 3 °F (36 3 °C) 96 20 (!) 185/93 100 %      Temp Source Heart Rate Source Patient Position - Orthostatic VS BP Location FiO2 (%)   Temporal Monitor Lying Left arm --      Pain Score       No Pain          Wt Readings from Last 1 Encounters:   11/11/22 52 8 kg (116 lb 8 oz)     Additional Vital Signs:   Date/Time Temp Pulse Resp BP MAP (mmHg) SpO2 O2 Device   11/12/22 0931 -- 70 -- 120/54 -- 96 % None (Room air)   11/12/22 07:34:26 97 9 °F (36 6 °C) 63 18 90/47 Abnormal  61 Abnormal  95 % --   11/11/22 23:09:20 97 9 °F (36 6 °C) 54 Abnormal  18 114/56 75 95 % --   11/11/22 1945 -- -- -- -- -- 95 % None (Room air)   11/11/22 1525 97 9 °F (36 6 °C) 63 20 88/45 Abnormal  59 Abnormal  96 % None (Room air)   11/11/22 0744 -- -- -- -- -- -- None (Room air)   11/11/22 07:26:50 97 9 °F (36 6 °C) -- 21 130/70 90 -- --   11/11/22 0600 97 7 °F (36 5 °C) 81 16 106/60 77 98 % None (Room air)   11/11/22 0500 -- 78 -- 112/60 -- 100 % --   11/11/22 0430 -- 89 -- 150/70 -- 100 % --   11/11/22 0330 -- 100 16 158/82 113 98 % None (Room air)   11/11/22 0320 -- -- -- -- -- -- None (Room air)   11/11/22 0251 97 3 °F (36 3 °C) Abnormal  96 20 185/93 Abnormal  -- 100 % None (Room air)       Pertinent Labs/Diagnostic Test Results:   XR chest 2 views   Final Result by Cindi Trent MD (11/11 0730)      No acute cardiopulmonary disease  CT head without contrast   Final Result by Paris Moncada DO (11/11 1055)      No acute intracranial abnormality is seen  Other findings as above              Results from last 7 days   Lab Units 11/12/22  0432 11/11/22  0322   WBC Thousand/uL 5 33 7 18   HEMOGLOBIN g/dL 11 9* 16 6   HEMATOCRIT % 35 2* 47 7   PLATELETS Thousands/uL 157 204   NEUTROS ABS Thousands/µL 3 28 5 25     Results from last 7 days   Lab Units 11/12/22  0432 11/11/22  0322   SODIUM mmol/L 138 134*   POTASSIUM mmol/L 3 7 4 4   CHLORIDE mmol/L 105 95*   CO2 mmol/L 26 28   ANION GAP mmol/L 7 11   BUN mg/dL 25 19   CREATININE mg/dL 1 04 1 27   EGFR ml/min/1 73sq m 66 51   CALCIUM mg/dL 8 0* 9 6   MAGNESIUM mg/dL 1 4*  --      Results from last 7 days   Lab Units 11/11/22  0322   AST U/L 28   ALT U/L 35   ALK PHOS U/L 139*   TOTAL PROTEIN g/dL 7 9   ALBUMIN g/dL 3 9   TOTAL BILIRUBIN mg/dL 1 48*     Results from last 7 days   Lab Units 11/12/22  1058 11/12/22  0732 11/11/22  2051 11/11/22  1623 11/11/22  1113 11/11/22  0926   POC GLUCOSE mg/dl 271* 203* 209* 205* 272* 298*     Results from last 7 days   Lab Units 11/12/22  0432 11/11/22  0322   GLUCOSE RANDOM mg/dL 189* 318*     Results from last 7 days   Lab Units 11/11/22  0322   HEMOGLOBIN A1C % 8 9*   EAG mg/dl 209     BETA-HYDROXYBUTYRATE   Date Value Ref Range Status   11/11/2022 0 2 <0 6 mmol/L Final      Results from last 7 days   Lab Units 11/11/22  0322   PH PHILLY  7 457*   PCO2 PHILLY mm Hg 36 9*   PO2 PHILLY mm Hg 66 9*   HCO3 PHILLY mmol/L 25 5   BASE EXC PHILLY mmol/L 1 9   O2 CONTENT PHILLY ml/dL 22 8   O2 HGB, VENOUS % 91 9*     Results from last 7 days   Lab Units 11/11/22  0322   CK TOTAL U/L 109     Results from last 7 days   Lab Units 11/11/22  0800 11/11/22  0549 11/11/22  0322   HS TNI 0HR ng/L  --   --  72*   HS TNI 2HR ng/L  --  75*  --    HSTNI D2 ng/L  --  3  --    HS TNI 4HR ng/L 85*  --   --    HSTNI D4 ng/L 13  --   --      Results from last 7 days   Lab Units 11/11/22  0549 11/11/22  0322   LACTIC ACID mmol/L 1 4 2 5*       Results from last 7 days   Lab Units 11/11/22  0330   CLARITY UA  Cloudy   COLOR UA  Yellow   SPEC GRAV UA  1 025   PH UA  6 0   GLUCOSE UA mg/dl >=1000 (1%)*   KETONES UA mg/dl Negative   BLOOD UA  Large*   PROTEIN UA mg/dl 100 (2+)*   NITRITE UA  Positive*   BILIRUBIN UA  Negative   UROBILINOGEN UA E U /dl 0 2   LEUKOCYTES UA  Elevated glucose may cause decreased leukocyte values  See urine microscopic for [facility] result/*   WBC UA /hpf Innumerable*   RBC UA /hpf 30-50*   BACTERIA UA /hpf Occasional   EPITHELIAL CELLS WET PREP /hpf Occasional     Results from last 7 days   Lab Units 11/11/22  0330 11/11/22 0322   BLOOD CULTURE   --  No Growth at 24 hrs  No Growth at 24 hrs  URINE CULTURE  Culture results to follow    --      ED Treatment:   Medication Administration from 11/11/2022 0247 to 11/11/2022 5647       Date/Time Order Dose Route Action     11/11/2022 0356 cefTRIAXone (ROCEPHIN) IVPB (premix in dextrose) 1,000 mg 50 mL 1,000 mg Intravenous New Bag     11/11/2022 0407 sodium chloride 0 9 % bolus 1,000 mL 1,000 mL Intravenous New Bag     Past Medical History:   Diagnosis Date   • Bladder cancer ([facility] )    • Diabetes mellitus ([facility] )    • Hypertension      Present on Admission:  • Acute cystitis  • Type 2 diabetes mellitus with hyperglycemia, without long-term current use of insulin (HCC)  • Moderate protein-calorie malnutrition (HCC)  • Elevated troponin  • Severe sepsis (HCC)  • Alzheimer's dementia ([facility] )  • Acute metabolic encephalopathy      Admitting Diagnosis: Altered mental status [R41 82]  Cystitis [N30 90]  Age/Sex: 79 y o  male  Admission Orders:  Scheduled Medications:  allopurinol, 200 mg, Oral, Daily  aspirin, 81 mg, Oral, Every Other Day  atorvastatin, 40 mg, Oral, Daily  cefTRIAXone, 1,000 mg, Intravenous, Q24H  enoxaparin, 30 mg, Subcutaneous, Daily  ferrous sulfate, 325 mg, Oral, Daily With Breakfast  insulin glargine, 15 Units, Subcutaneous, HS  insulin lispro, 1-5 Units, Subcutaneous, TID AC  insulin lispro, 1-5 Units, Subcutaneous, HS  memantine, 5 mg, Oral, BID  metoprolol tartrate, 12 5 mg, Oral, QAM  pantoprazole, 40 mg, Oral, Early Morning  pregabalin, 50 mg, Oral, BID    Continuous IV Infusions:  sodium chloride, 100 mL/hr, Intravenous, Continuous    PRN Meds:  acetaminophen, 650 mg, Oral, Q6H PRN          Network Utilization Review Department  ATTENTION: Please call with any questions or concerns to [phone] and carefully listen to the prompts so that you are directed to the right person  All voicemails are confidential   [facility] all requests for admission clinical reviews, approved or denied determinations and any other requests to dedicated fax number below belonging to the campus where the patient is receiving treatment   List of dedicated fax numbers for the Facilities:  [address] DENIALS (Administrative/Medical Necessity) [phone]   [address] (Maternity/NICU/Pediatrics) [phone]   3 Shruthi Schwab [phone]   Joi [address]sorio [address] [phone]   [address] [address] [address] [phone]   [facility] 134 8[address] [facility] [phone]

## 2022-11-13 VITALS
SYSTOLIC BLOOD PRESSURE: 123 MMHG | DIASTOLIC BLOOD PRESSURE: 65 MMHG | RESPIRATION RATE: 18 BRPM | TEMPERATURE: 97.7 F | WEIGHT: 116.5 LBS | HEART RATE: 63 BPM | BODY MASS INDEX: 18.72 KG/M2 | HEIGHT: 66 IN | OXYGEN SATURATION: 97 %

## 2022-11-13 LAB
ANION GAP SERPL CALCULATED.3IONS-SCNC: 5 MMOL/L (ref 4–13)
ATRIAL RATE: 77 BPM
ATRIAL RATE: 94 BPM
ATRIAL RATE: 94 BPM
BACTERIA UR CULT: ABNORMAL
BACTERIA UR CULT: ABNORMAL
BUN SERPL-MCNC: 17 MG/DL (ref 5–25)
CALCIUM SERPL-MCNC: 8.1 MG/DL (ref 8.3–10.1)
CHLORIDE SERPL-SCNC: 106 MMOL/L (ref 96–108)
CO2 SERPL-SCNC: 26 MMOL/L (ref 21–32)
CREAT SERPL-MCNC: 1.01 MG/DL (ref 0.6–1.3)
ERYTHROCYTE [DISTWIDTH] IN BLOOD BY AUTOMATED COUNT: 12.9 % (ref 11.6–15.1)
GFR SERPL CREATININE-BSD FRML MDRD: 68 ML/MIN/1.73SQ M
GLUCOSE SERPL-MCNC: 100 MG/DL (ref 65–140)
GLUCOSE SERPL-MCNC: 106 MG/DL (ref 65–140)
GLUCOSE SERPL-MCNC: 150 MG/DL (ref 65–140)
GLUCOSE SERPL-MCNC: 226 MG/DL (ref 65–140)
HCT VFR BLD AUTO: 37.1 % (ref 36.5–49.3)
HGB BLD-MCNC: 12.4 G/DL (ref 12–17)
MAGNESIUM SERPL-MCNC: 1.8 MG/DL (ref 1.6–2.6)
MCH RBC QN AUTO: 28.4 PG (ref 26.8–34.3)
MCHC RBC AUTO-ENTMCNC: 33.4 G/DL (ref 31.4–37.4)
MCV RBC AUTO: 85 FL (ref 82–98)
P AXIS: 190 DEGREES
P AXIS: 246 DEGREES
P AXIS: 58 DEGREES
PLATELET # BLD AUTO: 154 THOUSANDS/UL (ref 149–390)
PMV BLD AUTO: 9.7 FL (ref 8.9–12.7)
POTASSIUM SERPL-SCNC: 3.7 MMOL/L (ref 3.5–5.3)
PR INTERVAL: 148 MS
PR INTERVAL: 152 MS
PR INTERVAL: 178 MS
QRS AXIS: -49 DEGREES
QRS AXIS: -62 DEGREES
QRS AXIS: -67 DEGREES
QRSD INTERVAL: 130 MS
QRSD INTERVAL: 130 MS
QRSD INTERVAL: 136 MS
QT INTERVAL: 386 MS
QT INTERVAL: 388 MS
QT INTERVAL: 442 MS
QTC INTERVAL: 482 MS
QTC INTERVAL: 485 MS
QTC INTERVAL: 500 MS
RBC # BLD AUTO: 4.37 MILLION/UL (ref 3.88–5.62)
SODIUM SERPL-SCNC: 137 MMOL/L (ref 135–147)
T WAVE AXIS: -23 DEGREES
T WAVE AXIS: -27 DEGREES
T WAVE AXIS: -35 DEGREES
VENTRICULAR RATE: 77 BPM
VENTRICULAR RATE: 94 BPM
VENTRICULAR RATE: 94 BPM
WBC # BLD AUTO: 4.85 THOUSAND/UL (ref 4.31–10.16)

## 2022-11-13 RX ORDER — HALOPERIDOL 5 MG/ML
2 INJECTION INTRAMUSCULAR ONCE
Status: DISCONTINUED | OUTPATIENT
Start: 2022-11-13 | End: 2022-11-13 | Stop reason: HOSPADM

## 2022-11-13 RX ORDER — MAGNESIUM SULFATE 1 G/100ML
1 INJECTION INTRAVENOUS ONCE
Status: COMPLETED | OUTPATIENT
Start: 2022-11-13 | End: 2022-11-13

## 2022-11-13 RX ORDER — CEPHALEXIN 500 MG/1
500 CAPSULE ORAL EVERY 6 HOURS SCHEDULED
Qty: 12 CAPSULE | Refills: 0 | Status: SHIPPED | OUTPATIENT
Start: 2022-11-13 | End: 2022-11-16

## 2022-11-13 RX ORDER — CEFTRIAXONE 1 G/50ML
1000 INJECTION, SOLUTION INTRAVENOUS EVERY 24 HOURS
Status: DISCONTINUED | OUTPATIENT
Start: 2022-11-14 | End: 2022-11-13 | Stop reason: HOSPADM

## 2022-11-13 RX ADMIN — SODIUM CHLORIDE 100 ML/HR: 0.9 INJECTION, SOLUTION INTRAVENOUS at 04:43

## 2022-11-13 RX ADMIN — ATORVASTATIN CALCIUM 40 MG: 40 TABLET, FILM COATED ORAL at 08:57

## 2022-11-13 RX ADMIN — PREGABALIN 50 MG: 50 CAPSULE ORAL at 08:57

## 2022-11-13 RX ADMIN — INSULIN LISPRO 2 UNITS: 100 INJECTION, SOLUTION INTRAVENOUS; SUBCUTANEOUS at 11:16

## 2022-11-13 RX ADMIN — CEFTRIAXONE 1000 MG: 1 INJECTION, SOLUTION INTRAVENOUS at 04:43

## 2022-11-13 RX ADMIN — PREGABALIN 50 MG: 50 CAPSULE ORAL at 17:02

## 2022-11-13 RX ADMIN — FERROUS SULFATE TAB 325 MG (65 MG ELEMENTAL FE) 325 MG: 325 (65 FE) TAB at 08:57

## 2022-11-13 RX ADMIN — ACETAMINOPHEN 650 MG: 325 TABLET ORAL at 00:35

## 2022-11-13 RX ADMIN — MAGNESIUM SULFATE HEPTAHYDRATE 1 G: 1 INJECTION, SOLUTION INTRAVENOUS at 00:36

## 2022-11-13 RX ADMIN — Medication 12.5 MG: at 08:57

## 2022-11-13 RX ADMIN — PANTOPRAZOLE SODIUM 40 MG: 40 TABLET, DELAYED RELEASE ORAL at 06:14

## 2022-11-13 RX ADMIN — ENOXAPARIN SODIUM 30 MG: 30 INJECTION SUBCUTANEOUS at 08:57

## 2022-11-13 RX ADMIN — MEMANTINE 5 MG: 5 TABLET ORAL at 08:57

## 2022-11-13 RX ADMIN — ALLOPURINOL 200 MG: 100 TABLET ORAL at 08:57

## 2022-11-13 RX ADMIN — ASPIRIN 81 MG CHEWABLE TABLET 81 MG: 81 TABLET CHEWABLE at 08:57

## 2022-11-13 RX ADMIN — MEMANTINE 5 MG: 5 TABLET ORAL at 17:02

## 2022-11-13 NOTE — CASE MANAGEMENT
Case Management Discharge Planning Note    Patient name Lewis Montes  Location /-01 MRN [id number]  : 1940 Date 2022       Current Admission Date: 2022  Current Admission Diagnosis:Acute metabolic encephalopathy   Patient Active Problem List    Diagnosis Date Noted   • Acute cystitis 2022   • Type 2 diabetes mellitus with hyperglycemia, without long-term current use of insulin ([facility] ) 2022   • Moderate protein-calorie malnutrition ([facility] ) 2022   • Elevated troponin 2022   • Severe sepsis ([facility] ) 2022   • Alzheimer's dementia ([facility] ) 2022   • Acute metabolic encephalopathy    • History of laryngectomy 2022      LOS (days): 2  Geometric Mean LOS (GMLOS) (days): 5 00  Days to GMLOS:3     OBJECTIVE:  Risk of Unplanned Readmission Score: 11 85         Current admission status: Inpatient   Preferred Pharmacy: No Pharmacies Listed  Primary Care Provider: No primary care provider on file  Primary Insurance: BLUE CROSS  Secondary Insurance:     DISCHARGE DETAILS:    Discharge planning discussed with[de-identified] Aubrey Lim daughter     Comments - Freedom of Choice: CM discussed FOC for PT/OT recommendation for home health servcies on discharge  Abilio De Leon has no preferences but believes  [facility] health has her grandmother who is being discharged from rehab soon  Abiliohiram De Leon is in agreement with homehealth referrals in [facility] to update her with options   Abilio De Leon stated she will be comming up to [place] tomorrow am pickup grandfathers car and then will stay in [place] until her grandfather is discharge     Physicain updated on with same  CM contacted family/caregiver?: Yes Diantha Angles granddaughter)  Were Treatment Team discharge recommendations reviewed with patient/caregiver?: Yes     Were patient/caregiver advised of the risks associated with not following Treatment Team discharge recommendations?: Yes    Contacts  Patient Contacts: Abilio De Leon grnadaughter  Relationship to Patient[de-identified] Family  Contact Method: Phone  Phone Number: see face sheet  Reason/Outcome: Discharge [address]         Is the patient interested in [facility] at discharge?: Yes (Bowen Vera thinks [facility] is coming for her grandmother also)  [address] requested[de-identified] Nursing, Occupational Therapy, Physical [facility] Provider[de-identified] PCP  Home Health Services Needed[de-identified] Gait/ADL Training, Strengthening/Theraputic Exercises to Improve Function, Diabetes Management, Other (comment) (SN ASSESSMENT, MEDICATION MANAGEMENT -[address])  Homebound Criteria Met[de-identified] Requires the Assistance of Another Person for Safe Ambulation or to Leave the Home, Uses an Assist Device (i e  cane, walker, etc)  Supporting Clincal Findings[de-identified] Limited Endurance, Fatigues Easliy in [employer]         Would you like to participate in our [address] service program?  : No - Declined    Treatment Team Recommendation: Home with [address]  Discharge Destination Plan[de-identified] Home with Yony at Discharge : Family (Patient niece will be comming to [place] 11/14/22 and will stay until patient is discharge)          CM discussed  FOC with patient and caretaker at bedside/phone  Patient and caretaker agreeable with blanket referrals in [place] for local facilities / agencies to determine bed availability according to your medical needs and insurance coverage  Patient and caretaker has no preference     Abiliohiram Arthurier granddaughter thinks  [facility] is coming to see her grandmother when she is discharged from facility shortly  Referrals placed in aidin awaiting availability      Patient has no speaking valve HX Laryngectomy    CM to follow

## 2022-11-13 NOTE — NURSING NOTE
Patient complaining of chest pain and difficulty breathing  Upon assessment, patient did not appear to be in any kind of distress, respiratory rate normal, chest expansion symmetrical  VSS  TANYA Nguyen, made aware and at bedside  EKG obtained  Tylenol given for chest discomfort  Will continue to monitor

## 2022-11-13 NOTE — PLAN OF CARE
Problem: MOBILITY - ADULT  Goal: Maintain or return to baseline ADL function  Description: INTERVENTIONS:  -  Assess patient's ability to carry out ADLs; assess patient's baseline for ADL function and identify physical deficits which impact ability to perform ADLs (bathing, care of mouth/teeth, toileting, grooming, dressing, etc )  - Assess/evaluate cause of self-care deficits   - Assess range of motion  - Assess patient's mobility; develop plan if impaired  - Assess patient's need for assistive devices and provide as appropriate  - Encourage maximum independence but intervene and supervise when necessary  - Involve family in performance of ADLs  - Assess for home care needs following discharge   - Consider OT consult to assist with ADL evaluation and planning for discharge  - Provide patient education as appropriate  Outcome: Progressing  Goal: Maintains/Returns to pre admission functional level  Description: INTERVENTIONS:  - Perform BMAT or MOVE assessment daily    - Set and communicate daily mobility goal to care team and patient/family/caregiver  - Collaborate with rehabilitation services on mobility goals if consulted  - Perform Range of Motion - times a day  - Reposition patient every - hours    - Dangle patient - times a day  - Stand patient - times a day  - Ambulate patient - times a day  - Out of bed to chair - times a day   - Out of bed for meals - times a day  - Out of bed for toileting  - Record patient progress and toleration of activity level   Outcome: Progressing     Problem: PAIN - ADULT  Goal: Verbalizes/displays adequate comfort level or baseline comfort level  Description: Interventions:  - Encourage patient to monitor pain and request assistance  - Assess pain using appropriate pain scale  - Administer analgesics based on type and severity of pain and evaluate response  - Implement non-pharmacological measures as appropriate and evaluate response  - Consider cultural and social influences on pain and pain management  - Notify physician/advanced practitioner if interventions unsuccessful or patient reports new pain  Outcome: Progressing     Problem: INFECTION - ADULT  Goal: Absence or prevention of progression during hospitalization  Description: INTERVENTIONS:  - Assess and monitor for signs and symptoms of infection  - Monitor lab/diagnostic results  - Monitor all insertion sites, i e  indwelling lines, tubes, and drains  - Monitor endotracheal if appropriate and nasal secretions for changes in amount and color  - [place] appropriate cooling/warming therapies per order  - Administer medications as ordered  - Instruct and encourage patient and family to use good hand hygiene technique  - Identify and instruct in appropriate isolation precautions for identified infection/condition  Outcome: Progressing     Problem: SAFETY ADULT  Goal: Maintain or return to baseline ADL function  Description: INTERVENTIONS:  -  Assess patient's ability to carry out ADLs; assess patient's baseline for ADL function and identify physical deficits which impact ability to perform ADLs (bathing, care of mouth/teeth, toileting, grooming, dressing, etc )  - Assess/evaluate cause of self-care deficits   - Assess range of motion  - Assess patient's mobility; develop plan if impaired  - Assess patient's need for assistive devices and provide as appropriate  - Encourage maximum independence but intervene and supervise when necessary  - Involve family in performance of ADLs  - Assess for home care needs following discharge   - Consider OT consult to assist with ADL evaluation and planning for discharge  - Provide patient education as appropriate  Outcome: Progressing  Goal: Maintains/Returns to pre admission functional level  Description: INTERVENTIONS:  - Perform BMAT or MOVE assessment daily    - Set and communicate daily mobility goal to care team and patient/family/caregiver     - Collaborate with rehabilitation services on mobility goals if consulted  - Perform Range of Motion - times a day  - Reposition patient every - hours    - Dangle patient - times a day  - Stand patient - times a day  - Ambulate patient - times a day  - Out of bed to chair - times a day   - Out of bed for meals - times a day  - Out of bed for toileting  - Record patient progress and toleration of activity level   Outcome: Progressing  Goal: Patient will remain free of falls  Description: INTERVENTIONS:  - Educate patient/family on patient safety including physical limitations  - Instruct patient to call for assistance with activity   - Consult OT/PT to assist with strengthening/mobility   - Keep Call bell within reach  - Keep bed low and locked with side rails adjusted as appropriate  - Keep care items and personal belongings within reach  - Initiate and maintain comfort rounds  - Make Fall Risk Sign visible to staff  - Offer Toileting every 2 Hours, in advance of need  - Initiate/Maintain alarm  - Obtain necessary fall risk management equipment  - Apply yellow socks and bracelet for high fall risk patients  - Consider moving patient to room near nurses station  Outcome: Progressing     Problem: DISCHARGE PLANNING  Goal: Discharge to home or other facility with appropriate resources  Description: INTERVENTIONS:  - Identify barriers to discharge w/patient and caregiver  - Arrange for needed discharge resources and transportation as appropriate  - Identify discharge learning needs (meds, wound care, etc )  - Arrange for interpretive services to assist at discharge as needed  - Refer to Case Management Department for coordinating discharge planning if the patient needs post-hospital services based on physician/advanced practitioner order or complex needs related to functional status, cognitive ability, or social support system  Outcome: Progressing     Problem: Knowledge Deficit  Goal: Patient/family/caregiver demonstrates understanding of disease process, treatment plan, medications, and discharge instructions  Description: Complete learning assessment and assess knowledge base  Interventions:  - Provide teaching at level of understanding  - Provide teaching via preferred learning methods  Outcome: Progressing     Problem: Knowledge Deficit  Goal: Patient/family/caregiver demonstrates understanding of disease process, treatment plan, medications, and discharge instructions  Description: Complete learning assessment and assess knowledge base    Interventions:  - Provide teaching at level of understanding  - Provide teaching via preferred learning methods  Outcome: Progressing     Problem: NEUROSENSORY - ADULT  Goal: Achieves stable or improved neurological status  Description: INTERVENTIONS  - Monitor and report changes in neurological status  - Monitor vital signs such as temperature, blood pressure, glucose, and any other labs ordered   - Initiate measures to prevent increased intracranial pressure  - Monitor for seizure activity and implement precautions if appropriate      Outcome: Progressing  Goal: Remains free of injury related to seizures activity  Description: INTERVENTIONS  - Maintain airway, patient safety  and administer oxygen as ordered  - Monitor patient for seizure activity, document and report duration and description of seizure to physician/advanced practitioner  - If seizure occurs,  ensure patient safety during seizure  - Reorient patient post seizure  - Seizure pads on all 4 side rails  - Instruct patient/family to notify RN of any seizure activity including if an aura is experienced  - Instruct patient/family to call for assistance with activity based on nursing assessment  - Administer anti-seizure medications if ordered    Outcome: Progressing  Goal: Achieves maximal functionality and self care  Description: INTERVENTIONS  - Monitor swallowing and airway patency with patient fatigue and changes in neurological status  - Encourage and assist patient to increase activity and self care     - Encourage visually impaired, hearing impaired and aphasic patients to use assistive/communication devices  Outcome: Progressing     Problem: Prexisting or High Potential for Compromised Skin Integrity  Goal: Skin integrity is maintained or improved  Description: INTERVENTIONS:  - Identify patients at risk for skin breakdown  - Assess and monitor skin integrity  - Assess and monitor nutrition and hydration status  - Monitor labs   - Assess for incontinence   - Turn and reposition patient  - Assist with mobility/ambulation  - Relieve pressure over bony prominences  - Avoid friction and shearing  - Provide appropriate hygiene as needed including keeping skin clean and dry  - Evaluate need for skin moisturizer/barrier cream  - Collaborate with interdisciplinary team   - Patient/family teaching  - Consider wound care consult   Outcome: Progressing     Problem: Potential for Falls  Goal: Patient will remain free of falls  Description: INTERVENTIONS:  - Educate patient/family on patient safety including physical limitations  - Instruct patient to call for assistance with activity   - Consult OT/PT to assist with strengthening/mobility   - Keep Call bell within reach  - Keep bed low and locked with side rails adjusted as appropriate  - Keep care items and personal belongings within reach  - Initiate and maintain comfort rounds  - Make Fall Risk Sign visible to staff  - Offer Toileting every 2 Hours, in advance of need  - Initiate/Maintain alarm  - Obtain necessary fall risk management equipment  - Apply yellow socks and bracelet for high fall risk patients  - Consider moving patient to room near nurses station  Outcome: Progressing

## 2022-11-13 NOTE — ASSESSMENT & PLAN NOTE
Lab Results   Component Value Date    HGBA1C 8 9 (H) 11/11/2022       Recent Labs     11/12/22  1625 11/12/22  2122 11/13/22  0711 11/13/22  1054   POCGLU 219* 245* 100 226*       Blood Sugar Average: Last 72 hrs:  (P) 224 8  · Glucose 318 on admission   · Sliding scale insulin coverage with Accu-Cheks + basal insulin for improved glycemic control in setting of infection  · Titrate as needed  · Hypoglycemia protocol  · Metformin on hold

## 2022-11-13 NOTE — NURSING NOTE
[phone] patient wanted to [facility] and walk home to [place]  Supervisor and security contacted  RN deescelated pt and pt agreed to stay until granddaughter comes to pick him up  1800 pt turned chair alarm off himself and was found roaming the halls  Pt returned to his room  1:1 order obtained for pt safety

## 2022-11-13 NOTE — ASSESSMENT & PLAN NOTE
· History laryngectomy  · Does not have plug in currently  · No signs of infection or significant drainage present  · No speaking valve but able to communicate and express himself by mouthing and writing

## 2022-11-13 NOTE — ASSESSMENT & PLAN NOTE
· History Alzheimer's dementia  · Patient is from [place] and drove to [place] where he was found   · Continue Namenda  · Alert and oriented x3 at time of admission after fluid resuscitation and IV antibiotics  · Continue to monitor neuro status - seems to be at baseline  · Reorient as needed  · maintain good sleep-wake cycle

## 2022-11-13 NOTE — ASSESSMENT & PLAN NOTE
· Severe confusion on top of known dementia in setting of urinary tract infection and sepsis  · Patient found by [employer] [facility] police in his car, contacted his family in [place] who stated he left 2 days prior and was unable to be found  · CT head no acute abnormality  · Improving after fluid resuscitation  · monitor neuro status  · PT/OT/case management for assistance in returning patient to his family - patient will need to be under 24/7 surveillance once he gets back home  · Back to baseline mental status, pleasant, noncombative    Family arriving from [place] to here tonight 11/13 and have arranged to remain here until patient's discharge   Suspect discharge tomorrow 11/14

## 2022-11-13 NOTE — NURSING NOTE
Patient resting comfortably, denying any pain/discomfort, symptoms subsided  VSS  Will continue to monitor

## 2022-11-13 NOTE — PROGRESS NOTES
[address]  Progress Note - Edwin Jacki Sr  1940, 79 y o  male MRN: [id number]  Unit/Bed#: -[facility] Encounter: [id number]  Primary Care Provider: No primary care provider on file  Date and time admitted to hospital: 11/11/2022  2:47 AM    * Acute metabolic encephalopathy  Assessment & Plan  · Severe confusion on top of known dementia in setting of urinary tract infection and sepsis  · Patient found by [facility] police in his car, contacted his family in [place] who stated he left 2 days prior and was unable to be found  · CT head no acute abnormality  · Improving after fluid resuscitation  · monitor neuro status  · PT/OT/case management for assistance in returning patient to his family - patient will need to be under 24/7 surveillance once he gets back home  · Back to baseline mental status, pleasant, noncombative    Family arriving from [place] to here tonight 11/13 and have arranged to remain here until patient's discharge   Suspect discharge tomorrow 11/14    Severe sepsis (HCC)  Assessment & Plan  · Tachycardia, tachypnea, lactic acid elevation 2 5  · Urinary source of infection  · Ceftriaxone IV day #2/3  · Cultures showing staph coagulase negative and aerococcus urinae susceptible to ceftriaxone  · IV fluid resuscitation  · Afebrile with normal WBC since admission  · Sepsis now resolved    Acute cystitis  Assessment & Plan  · Innumerable pyuria on at urinalysis  · Plan as under severe sepsis    Alzheimer's dementia ([facility] [address] )  Assessment & Plan  · History Alzheimer's dementia  · Patient is from [place] and drove to [place] where he was found   · Continue Namenda  · Alert and oriented x3 at time of admission after fluid resuscitation and IV antibiotics  · Continue to monitor neuro status - seems to be at baseline  · Reorient as needed  · maintain good sleep-wake cycle    History of laryngectomy  Assessment & Plan  · History laryngectomy  · Does not have plug in currently  · No signs of infection or significant drainage present  · No speaking valve but able to communicate and express himself by mouthing and writing    Elevated troponin  Assessment & Plan  · Troponin 72 without EKG changes  · Denies chest pain  · Presumed secondary to urinary tract infection and sepsis  · Monitor  · HX CAD but patient will need to be registered appropriately before we can access Care everywhere for further details  · Continue daily aspirin, atorvastatin    Moderate protein-calorie malnutrition ([address] )  Assessment & Plan  Malnutrition Findings: Loss of subcutaneous fat in orbital, triceps, ribcage areas  Loss of muscle at temples, clavicles, scapula, extremities  Consultation to dietitian           BMI Findings: Body mass index is 18 8 kg/m²  Type 2 diabetes mellitus with hyperglycemia, without long-term current use of insulin [facility])  Assessment & Plan  Lab Results   Component Value Date    HGBA1C 8 9 (H) 11/11/2022       Recent Labs     11/12/22  1625 11/12/22  2122 11/13/22  0711 11/13/22  1054   POCGLU 219* 245* 100 226*       Blood Sugar Average: Last 72 hrs:  (P) 224 8  · Glucose 318 on admission   · Sliding scale insulin coverage with Accu-Cheks + basal insulin for improved glycemic control in setting of infection  · Titrate as needed  · Hypoglycemia protocol  · Metformin on hold      VTE Pharmacologic Prophylaxis: VTE Score: 4 Moderate Risk (Score 3-4) - Pharmacological DVT Prophylaxis Ordered: enoxaparin (Lovenox)  Patient Centered Rounds: I performed bedside rounds with nursing staff today  Discussions with Specialists or Other Care Team Provider: none    Education and Discussions with Family / Patient: Updated  (grandchild) via phone  Time Spent for Care: 20 minutes  More than 50% of total time spent on counseling and coordination of care as described above      Current Length of Stay: 2 day(s)  Current Patient Status: Inpatient   Certification Statement: The patient will continue to require additional inpatient hospital stay due to needs safe discharge to home with family who will be arriving tonight  Discharge Plan: Anticipate discharge tomorrow to home  Code Status: Level 1 - Full Code    Subjective:   Patient denies any complaints  Feeling well  Very pleasant  Objective:     Vitals:   Temp (24hrs), Av 8 °F (36 6 °C), Min:97 7 °F (36 5 °C), Max:98 1 °F (36 7 °C)    Temp:  [97 7 °F (36 5 °C)-98 1 °F (36 7 °C)] 97 7 °F (36 5 °C)  HR:  [53-63] 63  Resp:  [18] 18  BP: (123-131)/(61-65) 123/65  SpO2:  [95 %-98 %] 97 %  Body mass index is 18 8 kg/m²  Input and Output Summary (last 24 hours): Intake/Output Summary (Last 24 hours) at 2022 1502  Last data filed at 2022 1312  Gross per 24 hour   Intake 360 ml   Output 975 ml   Net -615 ml       Physical Exam:   Physical Exam  Vitals and nursing note reviewed  Constitutional:       General: He is not in acute distress  Appearance: He is not ill-appearing  HENT:      Head: Normocephalic and atraumatic  Neck:      Comments: Stoma site does not appear infected, patent, no drainage  Cardiovascular:      Rate and Rhythm: Normal rate and regular rhythm  Pulses: Normal pulses  Heart sounds: Normal heart sounds  Pulmonary:      Effort: Pulmonary effort is normal       Breath sounds: Normal breath sounds  Abdominal:      General: Bowel sounds are normal       Palpations: Abdomen is soft  Musculoskeletal:      Right lower leg: No edema  Left lower leg: No edema  Skin:     General: Skin is warm and dry  Neurological:      Mental Status: He is alert  Mental status is at baseline     Psychiatric:         Mood and Affect: Mood normal          Behavior: Behavior normal           Additional Data:     Labs:  Results from last 7 days   Lab Units 22  0547 22  0432   WBC Thousand/uL 4 85 5 33   HEMOGLOBIN g/dL 12 4 11 9*   HEMATOCRIT % 37 1 35 2*   PLATELETS Thousands/uL 154 157   NEUTROS PCT %  --  61   LYMPHS PCT %  --  23   MONOS PCT %  --  11   EOS PCT %  --  3     Results from last 7 days   Lab Units 11/13/22  0547 11/12/22  0432 11/11/22  0322   SODIUM mmol/L 137   < > 134*   POTASSIUM mmol/L 3 7   < > 4 4   CHLORIDE mmol/L 106   < > 95*   CO2 mmol/L 26   < > 28   BUN mg/dL 17   < > 19   CREATININE mg/dL 1 01   < > 1 27   ANION GAP mmol/L 5   < > 11   CALCIUM mg/dL 8 1*   < > 9 6   ALBUMIN g/dL  --   --  3 9   TOTAL BILIRUBIN mg/dL  --   --  1 48*   ALK PHOS U/L  --   --  139*   ALT U/L  --   --  35   AST U/L  --   --  28   GLUCOSE RANDOM mg/dL 106   < > 318*    < > = values in this interval not displayed  Results from last 7 days   Lab Units 11/13/22  1054 11/13/22  0711 11/12/22  2122 11/12/22  1625 11/12/22  1058 11/12/22  0732 11/11/22  2051 11/11/22  1623 11/11/22  1113 11/11/22  0926   POC GLUCOSE mg/dl 226* 100 245* 219* 271* 203* 209* 205* 272* 298*     Results from last 7 days   Lab Units 11/11/22  0322   HEMOGLOBIN A1C % 8 9*     Results from last 7 days   Lab Units 11/11/22  0549 11/11/22  0322   LACTIC ACID mmol/L 1 4 2 5*       Lines/Drains:  Invasive Devices  Report    Peripheral Intravenous Line  Duration           Peripheral IV 11/11/22 Right Antecubital 2 days                      Imaging: No pertinent imaging reviewed  Recent Cultures (last 7 days):   Results from last 7 days   Lab Units 11/11/22  0330 11/11/22  0322   BLOOD CULTURE   --  No Growth at 48 hrs  No Growth at 48 hrs     URINE CULTURE  >100,000 cfu/ml Staphylococcus coagulase negative*  50,000-59,000 cfu/ml Aerococcus urinae*  --        Last 24 Hours Medication List:   Current Facility-Administered Medications   Medication Dose Route Frequency Provider Last Rate   • acetaminophen  650 mg Oral Q6H PRN Sharonda S Ad, CRNP     • allopurinol  200 mg Oral Daily Sharonda S Ad, CRNP     • aspirin  81 mg Oral Every Other Day TANYA Blair     • atorvastatin  40 mg Oral Daily Sharonda S Ad, CRNP     • [START ON 11/14/2022] cefTRIAXone  1,000 mg Intravenous Q24H Robin Cheng PA-C     • enoxaparin  30 mg Subcutaneous Daily Sharonda S Ad, CRNP     • ferrous sulfate  325 mg Oral Daily With Breakfast Sharonda S Ad, CRNP     • insulin glargine  15 Units Subcutaneous HS Ambika Hart MD     • insulin lispro  1-5 Units Subcutaneous TID AC Sharonda S Ad, CRNP     • insulin lispro  1-5 Units Subcutaneous HS Sharonda S Ad, CRNP     • memantine  5 mg Oral BID Sharonda S Ad, CRNP     • metoprolol tartrate  12 5 mg Oral QAM Sharonda S Ad, CRNP     • pantoprazole  40 mg Oral Early Morning Sharonda S Ad, CRNP     • pregabalin  50 mg Oral BID Sharonda S Ad, CRNP     • sodium chloride  100 mL/hr Intravenous Continuous Sharonda S Ad, CRNP 100 mL/hr (11/13/22 0443)        Today, Patient Was Seen By: Robin Cheng    **Please Note: This note may have been constructed using a voice recognition system  **

## 2022-11-13 NOTE — NURSING NOTE
Pt taking button out of stoma, advised by respiratory to but 4x4 with tape lightly over it   Pt takes this off, provider aware

## 2022-11-13 NOTE — PLAN OF CARE
Problem: MOBILITY - ADULT  Goal: Maintain or return to baseline ADL function  Description: INTERVENTIONS:  -  Assess patient's ability to carry out ADLs; assess patient's baseline for ADL function and identify physical deficits which impact ability to perform ADLs (bathing, care of mouth/teeth, toileting, grooming, dressing, etc )  - Assess/evaluate cause of self-care deficits   - Assess range of motion  - Assess patient's mobility; develop plan if impaired  - Assess patient's need for assistive devices and provide as appropriate  - Encourage maximum independence but intervene and supervise when necessary  - Involve family in performance of ADLs  - Assess for home care needs following discharge   - Consider OT consult to assist with ADL evaluation and planning for discharge  - Provide patient education as appropriate  Outcome: Progressing  Goal: Maintains/Returns to pre admission functional level  Description: INTERVENTIONS:  - Perform BMAT or MOVE assessment daily    - Set and communicate daily mobility goal to care team and patient/family/caregiver  - Collaborate with rehabilitation services on mobility goals if consulted  - Perform Range of Motion 2 times a day  - Reposition patient every 2 hours    - Dangle patient 2 times a day  - Stand patient 2 times a day  - Ambulate patient 2 times a day  - Out of bed to chair 2 times a day   - Out of bed for meals 2 times a day  - Out of bed for toileting  - Record patient progress and toleration of activity level   Outcome: Progressing     Problem: PAIN - ADULT  Goal: Verbalizes/displays adequate comfort level or baseline comfort level  Description: Interventions:  - Encourage patient to monitor pain and request assistance  - Assess pain using appropriate pain scale  - Administer analgesics based on type and severity of pain and evaluate response  - Implement non-pharmacological measures as appropriate and evaluate response  - Consider cultural and social influences on pain and pain management  - Notify physician/advanced practitioner if interventions unsuccessful or patient reports new pain  Outcome: Progressing     Problem: INFECTION - ADULT  Goal: Absence or prevention of progression during hospitalization  Description: INTERVENTIONS:  - Assess and monitor for signs and symptoms of infection  - Monitor lab/diagnostic results  - Monitor all insertion sites, i e  indwelling lines, tubes, and drains  - Monitor endotracheal if appropriate and nasal secretions for changes in amount and color  - Jackson appropriate cooling/warming therapies per order  - Administer medications as ordered  - Instruct and encourage patient and family to use good hand hygiene technique  - Identify and instruct in appropriate isolation precautions for identified infection/condition  Outcome: Progressing     Problem: SAFETY ADULT  Goal: Maintain or return to baseline ADL function  Description: INTERVENTIONS:  -  Assess patient's ability to carry out ADLs; assess patient's baseline for ADL function and identify physical deficits which impact ability to perform ADLs (bathing, care of mouth/teeth, toileting, grooming, dressing, etc )  - Assess/evaluate cause of self-care deficits   - Assess range of motion  - Assess patient's mobility; develop plan if impaired  - Assess patient's need for assistive devices and provide as appropriate  - Encourage maximum independence but intervene and supervise when necessary  - Involve family in performance of ADLs  - Assess for home care needs following discharge   - Consider OT consult to assist with ADL evaluation and planning for discharge  - Provide patient education as appropriate  Outcome: Progressing  Goal: Maintains/Returns to pre admission functional level  Description: INTERVENTIONS:  - Perform BMAT or MOVE assessment daily    - Set and communicate daily mobility goal to care team and patient/family/caregiver     - Collaborate with rehabilitation services on mobility goals if consulted  - Perform Range of Motion 2 times a day  - Reposition patient every 2 hours  - Dangle patient 2 times a day  - Stand patient 2 times a day  - Ambulate patient 2 times a day  - Out of bed to chair 2 times a day   - Out of bed for meals 2 times a day  - Out of bed for toileting  - Record patient progress and toleration of activity level   Outcome: Progressing  Goal: Patient will remain free of falls  Description: INTERVENTIONS:  - Educate patient/family on patient safety including physical limitations  - Instruct patient to call for assistance with activity   - Consult OT/PT to assist with strengthening/mobility   - Keep Call bell within reach  - Keep bed low and locked with side rails adjusted as appropriate  - Keep care items and personal belongings within reach  - Initiate and maintain comfort rounds  - Make Fall Risk Sign visible to staff  - Offer Toileting every 2 Hours, in advance of need  - Initiate/Maintain 2alarm  - Obtain necessary fall risk management equipment: 2  - Apply yellow socks and bracelet for high fall risk patients  - Consider moving patient to room near nurses station  Outcome: Progressing     Problem: Knowledge Deficit  Goal: Patient/family/caregiver demonstrates understanding of disease process, treatment plan, medications, and discharge instructions  Description: Complete learning assessment and assess knowledge base    Interventions:  - Provide teaching at level of understanding  - Provide teaching via preferred learning methods  Outcome: Progressing     Problem: NEUROSENSORY - ADULT  Goal: Achieves stable or improved neurological status  Description: INTERVENTIONS  - Monitor and report changes in neurological status  - Monitor vital signs such as temperature, blood pressure, glucose, and any other labs ordered   - Initiate measures to prevent increased intracranial pressure  - Monitor for seizure activity and implement precautions if appropriate      Outcome: Progressing  Goal: Remains free of injury related to seizures activity  Description: INTERVENTIONS  - Maintain airway, patient safety  and administer oxygen as ordered  - Monitor patient for seizure activity, document and report duration and description of seizure to physician/advanced practitioner  - If seizure occurs,  ensure patient safety during seizure  - Reorient patient post seizure  - Seizure pads on all 4 side rails  - Instruct patient/family to notify RN of any seizure activity including if an aura is experienced  - Instruct patient/family to call for assistance with activity based on nursing assessment  - Administer anti-seizure medications if ordered    Outcome: Progressing  Goal: Achieves maximal functionality and self care  Description: INTERVENTIONS  - Monitor swallowing and airway patency with patient fatigue and changes in neurological status  - Encourage and assist patient to increase activity and self care     - Encourage visually impaired, hearing impaired and aphasic patients to use assistive/communication devices  Outcome: Progressing     Problem: Prexisting or High Potential for Compromised Skin Integrity  Goal: Skin integrity is maintained or improved  Description: INTERVENTIONS:  - Identify patients at risk for skin breakdown  - Assess and monitor skin integrity  - Assess and monitor nutrition and hydration status  - Monitor labs   - Assess for incontinence   - Turn and reposition patient  - Assist with mobility/ambulation  - Relieve pressure over bony prominences  - Avoid friction and shearing  - Provide appropriate hygiene as needed including keeping skin clean and dry  - Evaluate need for skin moisturizer/barrier cream  - Collaborate with interdisciplinary team   - Patient/family teaching  - Consider wound care consult   Outcome: Progressing

## 2022-11-13 NOTE — ASSESSMENT & PLAN NOTE
· Tachycardia, tachypnea, lactic acid elevation 2 5  · Urinary source of infection  · Ceftriaxone IV day #2/3  · Cultures showing staph coagulase negative and aerococcus urinae susceptible to ceftriaxone  · IV fluid resuscitation  · Afebrile with normal WBC since admission  · Sepsis now resolved

## 2022-11-13 NOTE — QUICK NOTE
Patient is demented and wandering around  Having difficulty to keep patient stay inside the room  Patient also learned her to turn off the alarm  For patient's safety, will place1:1 continue observation at this time for patient's safety

## 2022-11-14 LAB
ATRIAL RATE: 59 BPM
P AXIS: 55 DEGREES
PR INTERVAL: 182 MS
QRS AXIS: -63 DEGREES
QRSD INTERVAL: 130 MS
QT INTERVAL: 434 MS
QTC INTERVAL: 429 MS
T WAVE AXIS: -43 DEGREES
VENTRICULAR RATE: 59 BPM

## 2022-11-14 NOTE — UTILIZATION REVIEW
NOTIFICATION OF INPATIENT ADMISSION   AUTHORIZATION REQUEST   SERVICING FACILITY:   [address] [place] [address]  Rodolfo Henderson [facility], [address]  Tax ID: [id number]  NPI: [id number] ATTENDING PROVIDER:  Attending Name and NPI#: Bina Bailey [place], [place] [[id number]]  Address: [facility]  Rodolfo Galebrit [address] [place] [address], [address]  Phone: [phone]   ADMISSION INFORMATION:  Place of Service: [place] [address]  Place of Service Code: 21  Inpatient Admission Date/Time: 11/11/22  1:01 PM  Discharge Date/Time: 11/13/2022  7:30 PM  Admitting Diagnosis Code/Description:  Altered mental status [R41 82]  Cystitis [N30 90]     UTILIZATION REVIEW CONTACT:  Kim Carrillo Utilization   Network Utilization Review Department  Phone: [phone]  Fax [phone]  Email: [email or web address]  Contact for approvals/pending authorizations, clinical reviews, and discharge  PHYSICIAN ADVISORY SERVICES:  Medical Necessity Denial & [id number] Review  Phone: [phone]  Fax: [phone]  Email: [email or web address]

## 2022-11-15 NOTE — ASSESSMENT & PLAN NOTE
· Tachycardia, tachypnea, lactic acid elevation 2 5  · Urinary source of infection  · Ceftriaxone IV day #2/3 - discharged on keflex PO x 3 days  · Cultures showing staph coagulase negative and aerococcus urinae susceptible to ceftriaxone  · IV fluid resuscitation  · Afebrile with normal WBC since admission  · Sepsis now resolved

## 2022-11-15 NOTE — ASSESSMENT & PLAN NOTE
Lab Results   Component Value Date    HGBA1C 8 9 (H) 11/11/2022       Recent Labs     11/12/22  2122 11/13/22  0711 11/13/22  1054 11/13/22  1703   POCGLU 245* 100 226* 150*       Blood Sugar Average: Last 72 hrs:  (P) 218  · Glucose 318 on admission   · Sliding scale insulin coverage with Accu-Cheks + basal insulin for improved glycemic control in setting of infection  · Titrate as needed  · Hypoglycemia protocol  · Metformin on hold - resume on discharge - - -

## 2022-11-15 NOTE — DISCHARGE SUMMARY
[address]  Discharge- Luciano Stockton   1940, 79 y o  male MRN: [id number]  Unit/Bed#: -01 Encounter: [id number]  Primary Care Provider: No primary care provider on file  Date and time admitted to hospital: 11/11/2022  2:47 AM    * Acute metabolic encephalopathy  Assessment & Plan  · Severe confusion on top of known dementia in setting of urinary tract infection and sepsis  · Patient found by [facility] police in his car, contacted his family in [place] who stated he left 2 days prior and was unable to be found  · CT head no acute abnormality  · Improving after fluid resuscitation  · monitor neuro status  · PT/OT/case management for assistance in returning patient to his family - patient will need to be under 24/7 surveillance once he gets back home  · Back to baseline mental status, pleasant, noncombative    Family ended up traveling here tonight and patient was discharged to their care       Severe sepsis (HCC)  Assessment & Plan  · Tachycardia, tachypnea, lactic acid elevation 2 5  · Urinary source of infection  · Ceftriaxone IV day #2/3 - discharged on keflex PO x 3 days  · Cultures showing staph coagulase negative and aerococcus urinae susceptible to ceftriaxone  · IV fluid resuscitation  · Afebrile with normal WBC since admission  · Sepsis now resolved    Acute cystitis  Assessment & Plan  · Innumerable pyuria on at urinalysis  · Plan as under severe sepsis    Alzheimer's dementia ([facility] [address] )  Assessment & Plan  · History Alzheimer's dementia  · Patient is from [place] and drove to [place] where he was found   · Continue Namenda  · Alert and oriented x3 at time of admission after fluid resuscitation and IV antibiotics  · Continue to monitor neuro status - seems to be at baseline  · Reorient as needed  · maintain good sleep-wake cycle    History of laryngectomy  Assessment & Plan  · History laryngectomy  · Does not have plug in currently  · No signs of infection or significant drainage present  · No speaking valve but able to communicate and express himself by mouthing and writing    Elevated troponin  Assessment & Plan  · Troponin 72 without EKG changes  · Denies chest pain  · Presumed secondary to urinary tract infection and sepsis  · Monitor  · HX CAD but patient will need to be registered appropriately before we can access Care everywhere for further details  · Continue daily aspirin, atorvastatin    Moderate protein-calorie malnutrition ([address] )  Assessment & Plan  Malnutrition Findings: Loss of subcutaneous fat in orbital, triceps, ribcage areas  Loss of muscle at temples, clavicles, scapula, extremities  Consultation to dietitian           BMI Findings: Body mass index is 18 8 kg/m²  Type 2 diabetes mellitus with hyperglycemia, without long-term current use of insulin [facility])  Assessment & Plan  Lab Results   Component Value Date    HGBA1C 8 9 (H) 11/11/2022       Recent Labs     11/12/22  2122 11/13/22  0711 11/13/22  1054 11/13/22  1703   POCGLU 245* 100 226* 150*       Blood Sugar Average: Last 72 hrs:  (P) 218  · Glucose 318 on admission   · Sliding scale insulin coverage with Accu-Cheks + basal insulin for improved glycemic control in setting of infection  · Titrate as needed  · Hypoglycemia protocol  · Metformin on hold - resume on discharge      Medical Problems             Resolved Problems  Date Reviewed: 11/13/2022   None               Discharging Physician / Practitioner: Mode Quezada  PCP: No primary care provider on file  Admission Date:   Admission Orders (From admission, onward)     Ordered        11/11/22 1301  Inpatient Admission  Once            11/11/22 1113  Place in Observation  Once                      Discharge Date: 11/13/22    Consultations During Hospital Stay:  · none    Procedures Performed:   · none    Significant Findings / Test Results:     XR chest 2 views   Final Result      No acute cardiopulmonary disease  Workstation performed: [id number]         CT head without contrast   Final Result      No acute intracranial abnormality is seen  Other findings as above  Workstation performed: [id number]               Incidental Findings:   · none    Test Results Pending at Discharge (will require follow up):   · none     Outpatient Tests Requested:  none    Complications:  none    Reason for Admission: acute metabolic encephalopathy    Hospital Course:   Cydney Merlos  is a 79 y o  male patient who originally presented to the hospital on 11/11/2022 due to acute metabolic encephalopathy  Patient was found in his car in a parking lot  Brought to the ED as he was very confused and agitated  Family contacted and they had not seen the patient in two days and were looking for him  does still drive at home, has had declining memory  Patient drove from [place] to [place]  On presentation, found to have acute cystitis and was dehydrated  Patient returned to suspected baseline mental status after initiation of ceftriaxone and IV fluids  Received two doses of abx  Family plans were to pick patient up 11/14, however, patient began to become impatient requiring more frequent monitoring as he was attempting to wander the hospital being high risk for fall  Fortunately, family was able to pick patient up tonight with plans to return to [place]  Sent three additional days of keflex for the patient  Home health scheduled  Please see above list of diagnoses and related plan for additional information  Condition at Discharge: stable    Discharge Day Visit / Exam:   * Please refer to separate progress note for these details *    Discussion with Family: Updated  (daughter) via phone  Discharge instructions/Information to patient and family:   See after visit summary for information provided to patient and family        Provisions for Follow-Up Care:  See after visit summary for information related to follow-up care and any pertinent home health orders  Disposition:   Home with [facility] Services (Reminder: Complete face to face encounter)    Planned Readmission: none     Discharge Statement:  I spent 20 minutes discharging the patient  This time was spent on the day of discharge  I had direct contact with the patient on the day of discharge  Greater than 50% of the total time was spent examining patient, answering all patient questions, arranging and discussing plan of care with patient as well as directly providing post-discharge instructions  Additional time then spent on discharge activities  Discharge Medications:  See after visit summary for reconciled discharge medications provided to patient and/or family        **Please Note: This note may have been constructed using a voice recognition system**

## 2022-11-16 LAB
BACTERIA BLD CULT: NORMAL
BACTERIA BLD CULT: NORMAL

## 2022-11-25 NOTE — CASE MANAGEMENT
Case Management Discharge Planning Note    Patient name Sean Bailey  Location /-01 MRN [id number]  : 1940 Date 2022       Current Admission Date: 2022  Current Admission Diagnosis:Acute metabolic encephalopathy   Patient Active Problem List    Diagnosis Date Noted   • Acute cystitis 2022   • Type 2 diabetes mellitus with hyperglycemia, without long-term current use of insulin ([facility] [address] ) 2022   • Moderate protein-calorie malnutrition ([facility] [address] ) 2022   • Elevated troponin 2022   • Severe sepsis ([facility] [address] ) 2022   • Alzheimer's dementia ([facility] [address] ) 2022   • Acute metabolic encephalopathy    • History of laryngectomy 2022      LOS (days): 2  Geometric Mean LOS (GMLOS) (days): 5 00  Days to GMLOS:2 7     OBJECTIVE:  Risk of Unplanned Readmission Score: 14 12         Current admission status: Inpatient   Preferred Pharmacy: No Pharmacies Listed  Primary Care Provider: No primary care provider on file  Primary Insurance: BLUE CROSS  Secondary Insurance:     DISCHARGE DETAILS:        CM placed call to patient granddaughter Valdo Anthony and had to leave another message to please call CM at [address] [place] [address] in [place]  For follow up on patient home health services that are seeing patient post hospital    Stacieantonioalondra stated  [address]  grand yessiugher is agency coming in for her grandmother, asked cm to place referral to them for grandfather  CM Placed call to [facility]inn[facility] to confirm patient is active with them  office stated Patient is not active with them